# Patient Record
Sex: MALE | Race: WHITE | Employment: OTHER | URBAN - METROPOLITAN AREA
[De-identification: names, ages, dates, MRNs, and addresses within clinical notes are randomized per-mention and may not be internally consistent; named-entity substitution may affect disease eponyms.]

---

## 2017-01-01 ENCOUNTER — HOSPITAL ENCOUNTER (INPATIENT)
Facility: CLINIC | Age: 77
LOS: 5 days | DRG: 190 | End: 2017-02-03
Attending: EMERGENCY MEDICINE | Admitting: INTERNAL MEDICINE
Payer: COMMERCIAL

## 2017-01-01 ENCOUNTER — TELEPHONE (OUTPATIENT)
Dept: OTHER | Facility: CLINIC | Age: 77
End: 2017-01-01

## 2017-01-01 ENCOUNTER — APPOINTMENT (OUTPATIENT)
Dept: SPEECH THERAPY | Facility: CLINIC | Age: 77
DRG: 190 | End: 2017-01-01
Attending: INTERNAL MEDICINE
Payer: COMMERCIAL

## 2017-01-01 ENCOUNTER — APPOINTMENT (OUTPATIENT)
Dept: GENERAL RADIOLOGY | Facility: CLINIC | Age: 77
DRG: 190 | End: 2017-01-01
Attending: EMERGENCY MEDICINE
Payer: COMMERCIAL

## 2017-01-01 ENCOUNTER — APPOINTMENT (OUTPATIENT)
Dept: SPEECH THERAPY | Facility: CLINIC | Age: 77
DRG: 190 | End: 2017-01-01
Payer: COMMERCIAL

## 2017-01-01 ENCOUNTER — APPOINTMENT (OUTPATIENT)
Dept: ULTRASOUND IMAGING | Facility: CLINIC | Age: 77
DRG: 190 | End: 2017-01-01
Attending: INTERNAL MEDICINE
Payer: COMMERCIAL

## 2017-01-01 ENCOUNTER — APPOINTMENT (OUTPATIENT)
Dept: GENERAL RADIOLOGY | Facility: CLINIC | Age: 77
End: 2017-01-01
Attending: EMERGENCY MEDICINE
Payer: COMMERCIAL

## 2017-01-01 ENCOUNTER — TRANSFERRED RECORDS (OUTPATIENT)
Dept: HEALTH INFORMATION MANAGEMENT | Facility: CLINIC | Age: 77
End: 2017-01-01

## 2017-01-01 ENCOUNTER — PRE VISIT (OUTPATIENT)
Dept: CARDIOLOGY | Facility: CLINIC | Age: 77
End: 2017-01-01

## 2017-01-01 ENCOUNTER — APPOINTMENT (OUTPATIENT)
Dept: CARDIOLOGY | Facility: CLINIC | Age: 77
DRG: 190 | End: 2017-01-01
Attending: INTERNAL MEDICINE
Payer: COMMERCIAL

## 2017-01-01 ENCOUNTER — HOSPITAL ENCOUNTER (EMERGENCY)
Facility: CLINIC | Age: 77
Discharge: HOME | End: 2017-01-13
Attending: EMERGENCY MEDICINE | Admitting: EMERGENCY MEDICINE
Payer: COMMERCIAL

## 2017-01-01 ENCOUNTER — OFFICE VISIT (OUTPATIENT)
Dept: CARDIOLOGY | Facility: CLINIC | Age: 77
End: 2017-01-01

## 2017-01-01 ENCOUNTER — APPOINTMENT (OUTPATIENT)
Dept: CT IMAGING | Facility: CLINIC | Age: 77
DRG: 190 | End: 2017-01-01
Attending: INTERNAL MEDICINE
Payer: COMMERCIAL

## 2017-01-01 ENCOUNTER — HOSPITAL ENCOUNTER (OUTPATIENT)
Facility: CLINIC | Age: 77
End: 2017-01-01
Attending: RADIOLOGY | Admitting: RADIOLOGY
Payer: COMMERCIAL

## 2017-01-01 ENCOUNTER — CARE COORDINATION (OUTPATIENT)
Dept: OTHER | Facility: CLINIC | Age: 77
End: 2017-01-01

## 2017-01-01 VITALS
OXYGEN SATURATION: 94 % | SYSTOLIC BLOOD PRESSURE: 74 MMHG | DIASTOLIC BLOOD PRESSURE: 40 MMHG | BODY MASS INDEX: 20.69 KG/M2 | HEIGHT: 62 IN | WEIGHT: 112.43 LBS | HEART RATE: 90 BPM

## 2017-01-01 VITALS
SYSTOLIC BLOOD PRESSURE: 123 MMHG | BODY MASS INDEX: 22.45 KG/M2 | OXYGEN SATURATION: 97 % | WEIGHT: 139 LBS | HEART RATE: 97 BPM | RESPIRATION RATE: 18 BRPM | DIASTOLIC BLOOD PRESSURE: 90 MMHG | TEMPERATURE: 97.7 F

## 2017-01-01 VITALS
WEIGHT: 117.95 LBS | SYSTOLIC BLOOD PRESSURE: 70 MMHG | TEMPERATURE: 98.6 F | DIASTOLIC BLOOD PRESSURE: 40 MMHG | HEART RATE: 82 BPM | BODY MASS INDEX: 21.57 KG/M2 | RESPIRATION RATE: 10 BRPM | OXYGEN SATURATION: 100 %

## 2017-01-01 DIAGNOSIS — I50.9 ACUTE ON CHRONIC CONGESTIVE HEART FAILURE, UNSPECIFIED CONGESTIVE HEART FAILURE TYPE: ICD-10-CM

## 2017-01-01 DIAGNOSIS — J44.1 COPD EXACERBATION (H): ICD-10-CM

## 2017-01-01 DIAGNOSIS — D64.9 ANEMIA, UNSPECIFIED TYPE: ICD-10-CM

## 2017-01-01 DIAGNOSIS — R09.02 HYPOXEMIA: ICD-10-CM

## 2017-01-01 DIAGNOSIS — Z95.1 S/P CABG (CORONARY ARTERY BYPASS GRAFT): Primary | ICD-10-CM

## 2017-01-01 DIAGNOSIS — T85.528A DISLODGED GASTROSTOMY TUBE: ICD-10-CM

## 2017-01-01 LAB
ABO + RH BLD: NORMAL
ABO + RH BLD: NORMAL
ALBUMIN SERPL-MCNC: 2.2 G/DL (ref 3.4–5)
ALBUMIN SERPL-MCNC: 2.5 G/DL (ref 3.4–5)
ALBUMIN UR-MCNC: 10 MG/DL
ALP SERPL-CCNC: 145 U/L (ref 40–150)
ALP SERPL-CCNC: 192 U/L (ref 40–150)
ALT SERPL W P-5'-P-CCNC: 49 U/L (ref 0–70)
ALT SERPL W P-5'-P-CCNC: 69 U/L (ref 0–70)
ANION GAP SERPL CALCULATED.3IONS-SCNC: 11 MMOL/L (ref 3–14)
ANION GAP SERPL CALCULATED.3IONS-SCNC: 12 MMOL/L (ref 3–14)
ANION GAP SERPL CALCULATED.3IONS-SCNC: 5 MMOL/L (ref 3–14)
ANION GAP SERPL CALCULATED.3IONS-SCNC: 8 MMOL/L (ref 3–14)
ANION GAP SERPL CALCULATED.3IONS-SCNC: 9 MMOL/L (ref 3–14)
ANION GAP SERPL CALCULATED.3IONS-SCNC: 9 MMOL/L (ref 3–14)
APPEARANCE UR: CLEAR
AST SERPL W P-5'-P-CCNC: 111 U/L (ref 0–45)
AST SERPL W P-5'-P-CCNC: 69 U/L (ref 0–45)
BACTERIA SPEC CULT: ABNORMAL
BASE DEFICIT BLDA-SCNC: 4.7 MMOL/L
BASE DEFICIT BLDV-SCNC: 0.3 MMOL/L
BASE EXCESS BLD CALC-SCNC: 7.3 MMOL/L
BASE EXCESS BLDA CALC-SCNC: 7.7 MMOL/L
BASE EXCESS BLDA CALC-SCNC: 8 MMOL/L
BASE EXCESS BLDA CALC-SCNC: 8.1 MMOL/L
BASOPHILS # BLD AUTO: 0 10E9/L (ref 0–0.2)
BASOPHILS # BLD AUTO: 0 10E9/L (ref 0–0.2)
BASOPHILS NFR BLD AUTO: 0.2 %
BASOPHILS NFR BLD AUTO: 0.2 %
BILIRUB SERPL-MCNC: 0.4 MG/DL (ref 0.2–1.3)
BILIRUB SERPL-MCNC: 0.4 MG/DL (ref 0.2–1.3)
BILIRUB UR QL STRIP: NEGATIVE
BLD GP AB SCN SERPL QL: NORMAL
BLD PROD TYP BPU: NORMAL
BLD PROD TYP BPU: NORMAL
BLD UNIT ID BPU: 0
BLOOD BANK CMNT PATIENT-IMP: NORMAL
BLOOD PRODUCT CODE: NORMAL
BPU ID: NORMAL
BUN SERPL-MCNC: 11 MG/DL (ref 7–30)
BUN SERPL-MCNC: 13 MG/DL (ref 7–30)
BUN SERPL-MCNC: 15 MG/DL (ref 7–30)
BUN SERPL-MCNC: 18 MG/DL (ref 7–30)
BUN SERPL-MCNC: 19 MG/DL (ref 7–30)
BUN SERPL-MCNC: 28 MG/DL (ref 7–30)
C DIFF TOX B STL QL: ABNORMAL
CALCIUM SERPL-MCNC: 7.3 MG/DL (ref 8.5–10.1)
CALCIUM SERPL-MCNC: 7.5 MG/DL (ref 8.5–10.1)
CALCIUM SERPL-MCNC: 7.7 MG/DL (ref 8.5–10.1)
CALCIUM SERPL-MCNC: 7.9 MG/DL (ref 8.5–10.1)
CALCIUM SERPL-MCNC: 7.9 MG/DL (ref 8.5–10.1)
CALCIUM SERPL-MCNC: 8.8 MG/DL (ref 8.5–10.1)
CAOX CRY #/AREA URNS HPF: ABNORMAL /HPF
CHLORIDE SERPL-SCNC: 101 MMOL/L (ref 94–109)
CHLORIDE SERPL-SCNC: 105 MMOL/L (ref 94–109)
CHLORIDE SERPL-SCNC: 110 MMOL/L (ref 94–109)
CHLORIDE SERPL-SCNC: 111 MMOL/L (ref 94–109)
CHLORIDE SERPL-SCNC: 114 MMOL/L (ref 94–109)
CHLORIDE SERPL-SCNC: 115 MMOL/L (ref 94–109)
CO2 SERPL-SCNC: 22 MMOL/L (ref 20–32)
CO2 SERPL-SCNC: 23 MMOL/L (ref 20–32)
CO2 SERPL-SCNC: 24 MMOL/L (ref 20–32)
CO2 SERPL-SCNC: 26 MMOL/L (ref 20–32)
CO2 SERPL-SCNC: 30 MMOL/L (ref 20–32)
CO2 SERPL-SCNC: 31 MMOL/L (ref 20–32)
COLOR UR AUTO: YELLOW
CREAT SERPL-MCNC: 0.63 MG/DL (ref 0.66–1.25)
CREAT SERPL-MCNC: 0.64 MG/DL (ref 0.66–1.25)
CREAT SERPL-MCNC: 0.67 MG/DL (ref 0.66–1.25)
CREAT SERPL-MCNC: 0.75 MG/DL (ref 0.66–1.25)
CREAT SERPL-MCNC: 0.96 MG/DL (ref 0.66–1.25)
DIFFERENTIAL METHOD BLD: ABNORMAL
DIFFERENTIAL METHOD BLD: ABNORMAL
EOSINOPHIL # BLD AUTO: 0.2 10E9/L (ref 0–0.7)
EOSINOPHIL # BLD AUTO: 0.3 10E9/L (ref 0–0.7)
EOSINOPHIL NFR BLD AUTO: 2.3 %
EOSINOPHIL NFR BLD AUTO: 2.5 %
ERYTHROCYTE [DISTWIDTH] IN BLOOD BY AUTOMATED COUNT: 16.5 % (ref 10–15)
ERYTHROCYTE [DISTWIDTH] IN BLOOD BY AUTOMATED COUNT: 17.3 % (ref 10–15)
ERYTHROCYTE [DISTWIDTH] IN BLOOD BY AUTOMATED COUNT: 17.6 % (ref 10–15)
ERYTHROCYTE [DISTWIDTH] IN BLOOD BY AUTOMATED COUNT: 17.9 % (ref 10–15)
FLUAV H1 2009 PAND RNA SPEC QL NAA+PROBE: NEGATIVE
FLUAV H1 RNA SPEC QL NAA+PROBE: NEGATIVE
FLUAV H3 RNA SPEC QL NAA+PROBE: NEGATIVE
FLUAV RNA SPEC QL NAA+PROBE: NEGATIVE
FLUAV+FLUBV AG SPEC QL: NEGATIVE
FLUAV+FLUBV AG SPEC QL: NORMAL
FLUBV RNA SPEC QL NAA+PROBE: NEGATIVE
GFR SERPL CREATININE-BSD FRML MDRD: 76 ML/MIN/1.7M2
GFR SERPL CREATININE-BSD FRML MDRD: ABNORMAL ML/MIN/1.7M2
GFR SERPL CREATININE-BSD FRML MDRD: NORMAL ML/MIN/1.7M2
GLUCOSE BLDC GLUCOMTR-MCNC: 106 MG/DL (ref 70–99)
GLUCOSE BLDC GLUCOMTR-MCNC: 131 MG/DL (ref 70–99)
GLUCOSE BLDC GLUCOMTR-MCNC: 139 MG/DL (ref 70–99)
GLUCOSE SERPL-MCNC: 114 MG/DL (ref 70–99)
GLUCOSE SERPL-MCNC: 118 MG/DL (ref 70–99)
GLUCOSE SERPL-MCNC: 129 MG/DL (ref 70–99)
GLUCOSE SERPL-MCNC: 174 MG/DL (ref 70–99)
GLUCOSE SERPL-MCNC: 184 MG/DL (ref 70–99)
GLUCOSE SERPL-MCNC: 90 MG/DL (ref 70–99)
GLUCOSE UR STRIP-MCNC: NEGATIVE MG/DL
GRAM STN SPEC: NORMAL
HADV DNA SPEC QL NAA+PROBE: NEGATIVE
HADV DNA SPEC QL NAA+PROBE: NEGATIVE
HCO3 BLD-SCNC: 20 MMOL/L (ref 21–28)
HCO3 BLD-SCNC: 31 MMOL/L
HCO3 BLD-SCNC: 31 MMOL/L (ref 21–28)
HCO3 BLD-SCNC: 32 MMOL/L (ref 21–28)
HCO3 BLD-SCNC: 33 MMOL/L (ref 21–28)
HCO3 BLDV-SCNC: 23 MMOL/L (ref 21–28)
HCT VFR BLD AUTO: 21.2 % (ref 40–53)
HCT VFR BLD AUTO: 25 % (ref 40–53)
HCT VFR BLD AUTO: 25.1 % (ref 40–53)
HCT VFR BLD AUTO: 29.1 % (ref 40–53)
HGB BLD-MCNC: 7 G/DL (ref 13.3–17.7)
HGB BLD-MCNC: 8.2 G/DL (ref 13.3–17.7)
HGB BLD-MCNC: 8.4 G/DL (ref 13.3–17.7)
HGB BLD-MCNC: 9.9 G/DL (ref 13.3–17.7)
HGB UR QL STRIP: NEGATIVE
HMPV RNA SPEC QL NAA+PROBE: NEGATIVE
HPIV1 RNA SPEC QL NAA+PROBE: NEGATIVE
HPIV2 RNA SPEC QL NAA+PROBE: NEGATIVE
HPIV3 RNA SPEC QL NAA+PROBE: NEGATIVE
HYALINE CASTS #/AREA URNS LPF: 13 /LPF (ref 0–2)
IMM GRANULOCYTES # BLD: 0 10E9/L (ref 0–0.4)
IMM GRANULOCYTES # BLD: 0.1 10E9/L (ref 0–0.4)
IMM GRANULOCYTES NFR BLD: 0.3 %
IMM GRANULOCYTES NFR BLD: 0.5 %
INR PPP: 1.91 (ref 0.86–1.14)
INR PPP: 2.21 (ref 0.86–1.14)
INR PPP: 2.21 (ref 0.86–1.14)
INR PPP: 2.46 (ref 0.86–1.14)
INR PPP: 2.63 (ref 0.86–1.14)
INR PPP: 2.64 (ref 0.86–1.14)
INTERPRETATION ECG - MUSE: NORMAL
KETONES UR STRIP-MCNC: NEGATIVE MG/DL
LACTATE BLD-SCNC: 1.1 MMOL/L (ref 0.7–2.1)
LACTATE BLD-SCNC: 1.3 MMOL/L (ref 0.7–2.1)
LACTATE BLD-SCNC: 1.5 MMOL/L (ref 0.7–2.1)
LEUKOCYTE ESTERASE UR QL STRIP: NEGATIVE
LYMPHOCYTES # BLD AUTO: 1.2 10E9/L (ref 0.8–5.3)
LYMPHOCYTES # BLD AUTO: 1.4 10E9/L (ref 0.8–5.3)
LYMPHOCYTES NFR BLD AUTO: 12.3 %
LYMPHOCYTES NFR BLD AUTO: 14 %
Lab: NORMAL
MAGNESIUM SERPL-MCNC: 1.9 MG/DL (ref 1.6–2.3)
MAGNESIUM SERPL-MCNC: 2.1 MG/DL (ref 1.6–2.3)
MAGNESIUM SERPL-MCNC: 2.1 MG/DL (ref 1.6–2.3)
MCH RBC QN AUTO: 29.2 PG (ref 26.5–33)
MCH RBC QN AUTO: 29.8 PG (ref 26.5–33)
MCH RBC QN AUTO: 29.9 PG (ref 26.5–33)
MCH RBC QN AUTO: 31.1 PG (ref 26.5–33)
MCHC RBC AUTO-ENTMCNC: 32.8 G/DL (ref 31.5–36.5)
MCHC RBC AUTO-ENTMCNC: 33 G/DL (ref 31.5–36.5)
MCHC RBC AUTO-ENTMCNC: 33.5 G/DL (ref 31.5–36.5)
MCHC RBC AUTO-ENTMCNC: 34 G/DL (ref 31.5–36.5)
MCV RBC AUTO: 89 FL (ref 78–100)
MCV RBC AUTO: 89 FL (ref 78–100)
MCV RBC AUTO: 90 FL (ref 78–100)
MCV RBC AUTO: 92 FL (ref 78–100)
MICRO REPORT STATUS: ABNORMAL
MICRO REPORT STATUS: NORMAL
MICROBIOLOGIST REVIEW: NORMAL
MICROORGANISM SPEC CULT: ABNORMAL
MONOCYTES # BLD AUTO: 1 10E9/L (ref 0–1.3)
MONOCYTES # BLD AUTO: 1.1 10E9/L (ref 0–1.3)
MONOCYTES NFR BLD AUTO: 10.2 %
MONOCYTES NFR BLD AUTO: 10.7 %
MUCOUS THREADS #/AREA URNS LPF: PRESENT /LPF
NEUTROPHILS # BLD AUTO: 7 10E9/L (ref 1.6–8.3)
NEUTROPHILS # BLD AUTO: 7.2 10E9/L (ref 1.6–8.3)
NEUTROPHILS NFR BLD AUTO: 72.1 %
NEUTROPHILS NFR BLD AUTO: 74.7 %
NITRATE UR QL: NEGATIVE
NRBC # BLD AUTO: 0 10*3/UL
NRBC # BLD AUTO: 0 10*3/UL
NRBC BLD AUTO-RTO: 0 /100
NRBC BLD AUTO-RTO: 0 /100
NT-PROBNP SERPL-MCNC: 6870 PG/ML (ref 0–900)
NUM BPU REQUESTED: 1
O2/TOTAL GAS SETTING VFR VENT: ABNORMAL %
OXYHGB MFR BLD: 91 %
OXYHGB MFR BLD: 93 % (ref 92–100)
OXYHGB MFR BLD: 94 % (ref 92–100)
OXYHGB MFR BLD: 96 % (ref 92–100)
OXYHGB MFR BLD: 97 % (ref 92–100)
PCO2 BLD: 30 MM HG (ref 35–45)
PCO2 BLD: 39 MM HG (ref 35–45)
PCO2 BLD: 40 MM HG
PCO2 BLD: 40 MM HG (ref 35–45)
PCO2 BLD: 47 MM HG (ref 35–45)
PCO2 BLDV: 30 MM HG (ref 40–50)
PH BLD: 7.42 PH (ref 7.35–7.45)
PH BLD: 7.45 PH (ref 7.35–7.45)
PH BLD: 7.5 PH
PH BLD: 7.5 PH (ref 7.35–7.45)
PH BLD: 7.51 PH (ref 7.35–7.45)
PH BLDV: 7.5 PH (ref 7.32–7.43)
PH UR STRIP: 5.5 PH (ref 5–7)
PLATELET # BLD AUTO: 438 10E9/L (ref 150–450)
PLATELET # BLD AUTO: 441 10E9/L (ref 150–450)
PLATELET # BLD AUTO: 449 10E9/L (ref 150–450)
PLATELET # BLD AUTO: 563 10E9/L (ref 150–450)
PO2 BLD: 61 MM HG
PO2 BLD: 63 MM HG (ref 80–105)
PO2 BLD: 67 MM HG (ref 80–105)
PO2 BLD: 85 MM HG (ref 80–105)
PO2 BLD: 87 MM HG (ref 80–105)
PO2 BLDV: 44 MM HG (ref 25–47)
POTASSIUM SERPL-SCNC: 2.9 MMOL/L (ref 3.4–5.3)
POTASSIUM SERPL-SCNC: 3 MMOL/L (ref 3.4–5.3)
POTASSIUM SERPL-SCNC: 3.1 MMOL/L (ref 3.4–5.3)
POTASSIUM SERPL-SCNC: 3.5 MMOL/L (ref 3.4–5.3)
POTASSIUM SERPL-SCNC: 3.5 MMOL/L (ref 3.4–5.3)
POTASSIUM SERPL-SCNC: 3.6 MMOL/L (ref 3.4–5.3)
POTASSIUM SERPL-SCNC: 3.6 MMOL/L (ref 3.4–5.3)
POTASSIUM SERPL-SCNC: 4.1 MMOL/L (ref 3.4–5.3)
POTASSIUM SERPL-SCNC: 4.2 MMOL/L (ref 3.4–5.3)
PROCALCITONIN SERPL-MCNC: 0.06 NG/ML
PROT SERPL-MCNC: 6.8 G/DL (ref 6.8–8.8)
PROT SERPL-MCNC: 8.4 G/DL (ref 6.8–8.8)
RBC # BLD AUTO: 2.35 10E12/L (ref 4.4–5.9)
RBC # BLD AUTO: 2.81 10E12/L (ref 4.4–5.9)
RBC # BLD AUTO: 2.81 10E12/L (ref 4.4–5.9)
RBC # BLD AUTO: 3.18 10E12/L (ref 4.4–5.9)
RBC #/AREA URNS AUTO: 1 /HPF (ref 0–2)
RHINOVIRUS RNA SPEC QL NAA+PROBE: NEGATIVE
RSV RNA SPEC QL NAA+PROBE: NEGATIVE
RSV RNA SPEC QL NAA+PROBE: NEGATIVE
SODIUM SERPL-SCNC: 136 MMOL/L (ref 133–144)
SODIUM SERPL-SCNC: 140 MMOL/L (ref 133–144)
SODIUM SERPL-SCNC: 147 MMOL/L (ref 133–144)
SODIUM SERPL-SCNC: 149 MMOL/L (ref 133–144)
SP GR UR STRIP: 1.01 (ref 1–1.03)
SPECIMEN EXP DATE BLD: NORMAL
SPECIMEN SOURCE: ABNORMAL
SPECIMEN SOURCE: ABNORMAL
SPECIMEN SOURCE: NORMAL
TRANSFUSION STATUS PATIENT QL: NORMAL
TRANSFUSION STATUS PATIENT QL: NORMAL
TROPONIN I SERPL-MCNC: 0.02 UG/L (ref 0–0.04)
URN SPEC COLLECT METH UR: ABNORMAL
UROBILINOGEN UR STRIP-MCNC: NORMAL MG/DL (ref 0–2)
VORICONAZOLE SERPL-MCNC: 6.6 UG/ML
WBC # BLD AUTO: 10 10E9/L (ref 4–11)
WBC # BLD AUTO: 15.4 10E9/L (ref 4–11)
WBC # BLD AUTO: 9.4 10E9/L (ref 4–11)
WBC # BLD AUTO: 9.9 10E9/L (ref 4–11)
WBC #/AREA URNS AUTO: 1 /HPF (ref 0–2)

## 2017-01-01 PROCEDURE — 82803 BLOOD GASES ANY COMBINATION: CPT | Performed by: EMERGENCY MEDICINE

## 2017-01-01 PROCEDURE — 83735 ASSAY OF MAGNESIUM: CPT | Performed by: INTERNAL MEDICINE

## 2017-01-01 PROCEDURE — 85027 COMPLETE CBC AUTOMATED: CPT | Performed by: INTERNAL MEDICINE

## 2017-01-01 PROCEDURE — 87186 SC STD MICRODIL/AGAR DIL: CPT | Performed by: INTERNAL MEDICINE

## 2017-01-01 PROCEDURE — 40000275 ZZH STATISTIC RCP TIME EA 10 MIN

## 2017-01-01 PROCEDURE — 00000146 ZZHCL STATISTIC GLUCOSE BY METER IP

## 2017-01-01 PROCEDURE — 82565 ASSAY OF CREATININE: CPT | Performed by: INTERNAL MEDICINE

## 2017-01-01 PROCEDURE — 99292 CRITICAL CARE ADDL 30 MIN: CPT | Performed by: INTERNAL MEDICINE

## 2017-01-01 PROCEDURE — 40000886 ZZH STATISTIC STEP DOWN HRS DAY

## 2017-01-01 PROCEDURE — 36415 COLL VENOUS BLD VENIPUNCTURE: CPT | Performed by: INTERNAL MEDICINE

## 2017-01-01 PROCEDURE — 99291 CRITICAL CARE FIRST HOUR: CPT | Performed by: INTERNAL MEDICINE

## 2017-01-01 PROCEDURE — 83605 ASSAY OF LACTIC ACID: CPT | Performed by: INTERNAL MEDICINE

## 2017-01-01 PROCEDURE — 36415 COLL VENOUS BLD VENIPUNCTURE: CPT

## 2017-01-01 PROCEDURE — 94640 AIRWAY INHALATION TREATMENT: CPT | Mod: 76

## 2017-01-01 PROCEDURE — 25000128 H RX IP 250 OP 636: Performed by: INTERNAL MEDICINE

## 2017-01-01 PROCEDURE — 80299 QUANTITATIVE ASSAY DRUG: CPT | Performed by: INTERNAL MEDICINE

## 2017-01-01 PROCEDURE — 40000884 ZZH STATISTIC STEP DOWN HRS NIGHT

## 2017-01-01 PROCEDURE — 86923 COMPATIBILITY TEST ELECTRIC: CPT | Performed by: INTERNAL MEDICINE

## 2017-01-01 PROCEDURE — 84132 ASSAY OF SERUM POTASSIUM: CPT | Performed by: INTERNAL MEDICINE

## 2017-01-01 PROCEDURE — 99233 SBSQ HOSP IP/OBS HIGH 50: CPT | Performed by: INTERNAL MEDICINE

## 2017-01-01 PROCEDURE — 20000003 ZZH R&B ICU

## 2017-01-01 PROCEDURE — 80048 BASIC METABOLIC PNL TOTAL CA: CPT | Performed by: INTERNAL MEDICINE

## 2017-01-01 PROCEDURE — 12000007 ZZH R&B INTERMEDIATE

## 2017-01-01 PROCEDURE — 40000225 ZZH STATISTIC SLP WARD VISIT

## 2017-01-01 PROCEDURE — 71010 XR CHEST PORT 1 VW: CPT

## 2017-01-01 PROCEDURE — 80053 COMPREHEN METABOLIC PANEL: CPT | Performed by: EMERGENCY MEDICINE

## 2017-01-01 PROCEDURE — 85610 PROTHROMBIN TIME: CPT | Performed by: INTERNAL MEDICINE

## 2017-01-01 PROCEDURE — 25000125 ZZHC RX 250: Performed by: INTERNAL MEDICINE

## 2017-01-01 PROCEDURE — 21400002 ZZH R&B CCU CICU CRITICAL

## 2017-01-01 PROCEDURE — 25000132 ZZH RX MED GY IP 250 OP 250 PS 637: Performed by: INTERNAL MEDICINE

## 2017-01-01 PROCEDURE — 87804 INFLUENZA ASSAY W/OPTIC: CPT | Performed by: INTERNAL MEDICINE

## 2017-01-01 PROCEDURE — 87040 BLOOD CULTURE FOR BACTERIA: CPT | Performed by: EMERGENCY MEDICINE

## 2017-01-01 PROCEDURE — 94640 AIRWAY INHALATION TREATMENT: CPT

## 2017-01-01 PROCEDURE — 82805 BLOOD GASES W/O2 SATURATION: CPT | Performed by: INTERNAL MEDICINE

## 2017-01-01 PROCEDURE — 94660 CPAP INITIATION&MGMT: CPT

## 2017-01-01 PROCEDURE — 40000281 ZZH STATISTIC TRANSPORT TIME EA 15 MIN

## 2017-01-01 PROCEDURE — 40000885 ZZH STATISTIC STEP DOWN HRS EVENING

## 2017-01-01 PROCEDURE — 92526 ORAL FUNCTION THERAPY: CPT | Mod: GN

## 2017-01-01 PROCEDURE — 87633 RESP VIRUS 12-25 TARGETS: CPT | Performed by: INTERNAL MEDICINE

## 2017-01-01 PROCEDURE — 99207 ZZC NON-BILLABLE SERV PER CHARTING: CPT | Performed by: INTERNAL MEDICINE

## 2017-01-01 PROCEDURE — P9016 RBC LEUKOCYTES REDUCED: HCPCS | Performed by: INTERNAL MEDICINE

## 2017-01-01 PROCEDURE — 99285 EMERGENCY DEPT VISIT HI MDM: CPT | Mod: 25

## 2017-01-01 PROCEDURE — 93325 DOPPLER ECHO COLOR FLOW MAPG: CPT | Mod: 26 | Performed by: INTERNAL MEDICINE

## 2017-01-01 PROCEDURE — 93308 TTE F-UP OR LMTD: CPT

## 2017-01-01 PROCEDURE — 96375 TX/PRO/DX INJ NEW DRUG ADDON: CPT

## 2017-01-01 PROCEDURE — 96366 THER/PROPH/DIAG IV INF ADDON: CPT

## 2017-01-01 PROCEDURE — 87077 CULTURE AEROBIC IDENTIFY: CPT | Performed by: INTERNAL MEDICINE

## 2017-01-01 PROCEDURE — 86900 BLOOD TYPING SEROLOGIC ABO: CPT | Performed by: INTERNAL MEDICINE

## 2017-01-01 PROCEDURE — 99223 1ST HOSP IP/OBS HIGH 75: CPT | Mod: AI | Performed by: INTERNAL MEDICINE

## 2017-01-01 PROCEDURE — 87205 SMEAR GRAM STAIN: CPT | Performed by: INTERNAL MEDICINE

## 2017-01-01 PROCEDURE — 86901 BLOOD TYPING SEROLOGIC RH(D): CPT | Performed by: INTERNAL MEDICINE

## 2017-01-01 PROCEDURE — 25000128 H RX IP 250 OP 636: Performed by: EMERGENCY MEDICINE

## 2017-01-01 PROCEDURE — 25000132 ZZH RX MED GY IP 250 OP 250 PS 637

## 2017-01-01 PROCEDURE — 86850 RBC ANTIBODY SCREEN: CPT | Performed by: INTERNAL MEDICINE

## 2017-01-01 PROCEDURE — 99207 ZZC CDG-CORRECTLY CODED, REVIEWED AND AGREE: CPT | Performed by: INTERNAL MEDICINE

## 2017-01-01 PROCEDURE — 85025 COMPLETE CBC W/AUTO DIFF WBC: CPT | Performed by: EMERGENCY MEDICINE

## 2017-01-01 PROCEDURE — 93005 ELECTROCARDIOGRAM TRACING: CPT

## 2017-01-01 PROCEDURE — 25000125 ZZHC RX 250: Performed by: EMERGENCY MEDICINE

## 2017-01-01 PROCEDURE — 85610 PROTHROMBIN TIME: CPT | Performed by: EMERGENCY MEDICINE

## 2017-01-01 PROCEDURE — 93010 ELECTROCARDIOGRAM REPORT: CPT | Performed by: INTERNAL MEDICINE

## 2017-01-01 PROCEDURE — 25000125 ZZHC RX 250

## 2017-01-01 PROCEDURE — 83605 ASSAY OF LACTIC ACID: CPT | Performed by: EMERGENCY MEDICINE

## 2017-01-01 PROCEDURE — 40000940 XR ABDOMEN 1 VW

## 2017-01-01 PROCEDURE — 84145 PROCALCITONIN (PCT): CPT | Performed by: EMERGENCY MEDICINE

## 2017-01-01 PROCEDURE — 84484 ASSAY OF TROPONIN QUANT: CPT | Performed by: EMERGENCY MEDICINE

## 2017-01-01 PROCEDURE — 93970 EXTREMITY STUDY: CPT

## 2017-01-01 PROCEDURE — 92526 ORAL FUNCTION THERAPY: CPT | Mod: GN | Performed by: SPEECH-LANGUAGE PATHOLOGIST

## 2017-01-01 PROCEDURE — 96365 THER/PROPH/DIAG IV INF INIT: CPT

## 2017-01-01 PROCEDURE — 40000225 ZZH STATISTIC SLP WARD VISIT: Performed by: SPEECH-LANGUAGE PATHOLOGIST

## 2017-01-01 PROCEDURE — 93308 TTE F-UP OR LMTD: CPT | Mod: 26 | Performed by: INTERNAL MEDICINE

## 2017-01-01 PROCEDURE — 71250 CT THORAX DX C-: CPT

## 2017-01-01 PROCEDURE — 81001 URINALYSIS AUTO W/SCOPE: CPT | Performed by: EMERGENCY MEDICINE

## 2017-01-01 PROCEDURE — 99223 1ST HOSP IP/OBS HIGH 75: CPT | Performed by: INTERNAL MEDICINE

## 2017-01-01 PROCEDURE — 87493 C DIFF AMPLIFIED PROBE: CPT | Performed by: INTERNAL MEDICINE

## 2017-01-01 PROCEDURE — 92610 EVALUATE SWALLOWING FUNCTION: CPT | Mod: GN

## 2017-01-01 PROCEDURE — 36600 WITHDRAWAL OF ARTERIAL BLOOD: CPT

## 2017-01-01 PROCEDURE — 87070 CULTURE OTHR SPECIMN AEROBIC: CPT | Performed by: INTERNAL MEDICINE

## 2017-01-01 PROCEDURE — 83880 ASSAY OF NATRIURETIC PEPTIDE: CPT | Performed by: EMERGENCY MEDICINE

## 2017-01-01 PROCEDURE — 93321 DOPPLER ECHO F-UP/LMTD STD: CPT | Mod: 26 | Performed by: INTERNAL MEDICINE

## 2017-01-01 PROCEDURE — 71020 XR CHEST 2 VW: CPT

## 2017-01-01 RX ORDER — LEVOFLOXACIN 5 MG/ML
750 INJECTION, SOLUTION INTRAVENOUS ONCE
Status: COMPLETED | OUTPATIENT
Start: 2017-01-01 | End: 2017-01-01

## 2017-01-01 RX ORDER — MORPHINE SULFATE 2 MG/ML
2 INJECTION, SOLUTION INTRAMUSCULAR; INTRAVENOUS ONCE
Status: COMPLETED | OUTPATIENT
Start: 2017-01-01 | End: 2017-01-01

## 2017-01-01 RX ORDER — LEVOFLOXACIN 5 MG/ML
750 INJECTION, SOLUTION INTRAVENOUS EVERY 24 HOURS
Status: DISCONTINUED | OUTPATIENT
Start: 2017-01-01 | End: 2017-01-01

## 2017-01-01 RX ORDER — AMIODARONE HYDROCHLORIDE 200 MG/1
200 TABLET ORAL DAILY
Qty: 60 TABLET | Refills: 1 | Status: SHIPPED | OUTPATIENT
Start: 2017-01-01

## 2017-01-01 RX ORDER — MORPHINE SULFATE 4 MG/ML
4-8 INJECTION, SOLUTION INTRAMUSCULAR; INTRAVENOUS EVERY 30 MIN PRN
Status: DISCONTINUED | OUTPATIENT
Start: 2017-01-01 | End: 2017-01-01

## 2017-01-01 RX ORDER — HALOPERIDOL 5 MG/ML
2-4 INJECTION INTRAMUSCULAR EVERY 4 HOURS PRN
Status: DISCONTINUED | OUTPATIENT
Start: 2017-01-01 | End: 2017-01-01 | Stop reason: HOSPADM

## 2017-01-01 RX ORDER — AMIODARONE HYDROCHLORIDE 200 MG/1
200 TABLET ORAL DAILY
Status: DISCONTINUED | OUTPATIENT
Start: 2017-01-01 | End: 2017-01-01 | Stop reason: SINTOL

## 2017-01-01 RX ORDER — ONDANSETRON 2 MG/ML
4 INJECTION INTRAMUSCULAR; INTRAVENOUS EVERY 6 HOURS PRN
Status: DISCONTINUED | OUTPATIENT
Start: 2017-01-01 | End: 2017-01-01 | Stop reason: HOSPADM

## 2017-01-01 RX ORDER — IPRATROPIUM BROMIDE AND ALBUTEROL SULFATE 2.5; .5 MG/3ML; MG/3ML
SOLUTION RESPIRATORY (INHALATION)
Status: COMPLETED
Start: 2017-01-01 | End: 2017-01-01

## 2017-01-01 RX ORDER — PREDNISONE 20 MG/1
40 TABLET ORAL DAILY
Status: DISCONTINUED | OUTPATIENT
Start: 2017-01-01 | End: 2017-01-01

## 2017-01-01 RX ORDER — FUROSEMIDE 10 MG/ML
40 INJECTION INTRAMUSCULAR; INTRAVENOUS ONCE
Status: COMPLETED | OUTPATIENT
Start: 2017-01-01 | End: 2017-01-01

## 2017-01-01 RX ORDER — FUROSEMIDE 10 MG/ML
40 INJECTION INTRAMUSCULAR; INTRAVENOUS ONCE
Status: DISCONTINUED | OUTPATIENT
Start: 2017-01-01 | End: 2017-01-01 | Stop reason: HOSPADM

## 2017-01-01 RX ORDER — MAGNESIUM SULFATE HEPTAHYDRATE 40 MG/ML
4 INJECTION, SOLUTION INTRAVENOUS EVERY 4 HOURS PRN
Status: DISCONTINUED | OUTPATIENT
Start: 2017-01-01 | End: 2017-01-01

## 2017-01-01 RX ORDER — LIDOCAINE 40 MG/G
CREAM TOPICAL
Status: DISCONTINUED | OUTPATIENT
Start: 2017-01-01 | End: 2017-01-01

## 2017-01-01 RX ORDER — ASPIRIN 81 MG/1
81 TABLET, CHEWABLE ORAL DAILY
Status: DISCONTINUED | OUTPATIENT
Start: 2017-01-01 | End: 2017-01-01

## 2017-01-01 RX ORDER — PIPERACILLIN SODIUM, TAZOBACTAM SODIUM 4; .5 G/20ML; G/20ML
4.5 INJECTION, POWDER, LYOPHILIZED, FOR SOLUTION INTRAVENOUS EVERY 6 HOURS
Status: DISCONTINUED | OUTPATIENT
Start: 2017-01-01 | End: 2017-01-01

## 2017-01-01 RX ORDER — IPRATROPIUM BROMIDE AND ALBUTEROL SULFATE 2.5; .5 MG/3ML; MG/3ML
3 SOLUTION RESPIRATORY (INHALATION)
Status: DISCONTINUED | OUTPATIENT
Start: 2017-01-01 | End: 2017-01-01

## 2017-01-01 RX ORDER — POTASSIUM CHLORIDE 7.45 MG/ML
10 INJECTION INTRAVENOUS
Status: DISCONTINUED | OUTPATIENT
Start: 2017-01-01 | End: 2017-01-01

## 2017-01-01 RX ORDER — WARFARIN SODIUM 1 MG/1
1 TABLET ORAL
Status: COMPLETED | OUTPATIENT
Start: 2017-01-01 | End: 2017-01-01

## 2017-01-01 RX ORDER — METOPROLOL SUCCINATE 50 MG/1
50 TABLET, EXTENDED RELEASE ORAL EVERY EVENING
Status: DISCONTINUED | OUTPATIENT
Start: 2017-01-01 | End: 2017-01-01

## 2017-01-01 RX ORDER — AMOXICILLIN 250 MG
1-2 CAPSULE ORAL 2 TIMES DAILY PRN
Status: DISCONTINUED | OUTPATIENT
Start: 2017-01-01 | End: 2017-01-01

## 2017-01-01 RX ORDER — POTASSIUM CHLORIDE 29.8 MG/ML
20 INJECTION INTRAVENOUS
Status: DISCONTINUED | OUTPATIENT
Start: 2017-01-01 | End: 2017-01-01

## 2017-01-01 RX ORDER — BISACODYL 10 MG
10 SUPPOSITORY, RECTAL RECTAL DAILY PRN
Status: DISCONTINUED | OUTPATIENT
Start: 2017-01-01 | End: 2017-01-01 | Stop reason: HOSPADM

## 2017-01-01 RX ORDER — CEFAZOLIN SODIUM 1 G/50ML
1250 SOLUTION INTRAVENOUS EVERY 12 HOURS
Status: DISCONTINUED | OUTPATIENT
Start: 2017-01-01 | End: 2017-01-01

## 2017-01-01 RX ORDER — ONDANSETRON 4 MG/1
4 TABLET, ORALLY DISINTEGRATING ORAL EVERY 6 HOURS PRN
Status: DISCONTINUED | OUTPATIENT
Start: 2017-01-01 | End: 2017-01-01 | Stop reason: HOSPADM

## 2017-01-01 RX ORDER — ONDANSETRON 2 MG/ML
4 INJECTION INTRAMUSCULAR; INTRAVENOUS EVERY 6 HOURS PRN
Status: DISCONTINUED | OUTPATIENT
Start: 2017-01-01 | End: 2017-01-01

## 2017-01-01 RX ORDER — HALOPERIDOL 5 MG/ML
1 INJECTION INTRAMUSCULAR EVERY 6 HOURS PRN
Status: DISCONTINUED | OUTPATIENT
Start: 2017-01-01 | End: 2017-01-01

## 2017-01-01 RX ORDER — HALOPERIDOL 5 MG/ML
1-2 INJECTION INTRAMUSCULAR EVERY 4 HOURS PRN
Status: DISCONTINUED | OUTPATIENT
Start: 2017-01-01 | End: 2017-01-01

## 2017-01-01 RX ORDER — POTASSIUM CHLORIDE 1.5 G/1.58G
20-40 POWDER, FOR SOLUTION ORAL
Status: DISCONTINUED | OUTPATIENT
Start: 2017-01-01 | End: 2017-01-01

## 2017-01-01 RX ORDER — MORPHINE SULFATE 2 MG/ML
2-4 INJECTION, SOLUTION INTRAMUSCULAR; INTRAVENOUS EVERY 30 MIN PRN
Status: DISCONTINUED | OUTPATIENT
Start: 2017-01-01 | End: 2017-01-01

## 2017-01-01 RX ORDER — GLYCOPYRROLATE 0.2 MG/ML
.2-.4 INJECTION, SOLUTION INTRAMUSCULAR; INTRAVENOUS EVERY 4 HOURS PRN
Status: DISCONTINUED | OUTPATIENT
Start: 2017-01-01 | End: 2017-01-01 | Stop reason: HOSPADM

## 2017-01-01 RX ORDER — POTASSIUM CHLORIDE 1500 MG/1
20-40 TABLET, EXTENDED RELEASE ORAL
Status: DISCONTINUED | OUTPATIENT
Start: 2017-01-01 | End: 2017-01-01

## 2017-01-01 RX ORDER — PIPERACILLIN SODIUM, TAZOBACTAM SODIUM 4; .5 G/20ML; G/20ML
4.5 INJECTION, POWDER, LYOPHILIZED, FOR SOLUTION INTRAVENOUS ONCE
Status: COMPLETED | OUTPATIENT
Start: 2017-01-01 | End: 2017-01-01

## 2017-01-01 RX ORDER — MULTIPLE VITAMINS W/ MINERALS TAB 9MG-400MCG
1 TAB ORAL DAILY
COMMUNITY

## 2017-01-01 RX ORDER — CEFTRIAXONE 1 G/1
1 INJECTION, POWDER, FOR SOLUTION INTRAMUSCULAR; INTRAVENOUS EVERY 24 HOURS
Status: DISCONTINUED | OUTPATIENT
Start: 2017-01-01 | End: 2017-01-01

## 2017-01-01 RX ORDER — VANCOMYCIN HYDROCHLORIDE 1 G/200ML
1000 INJECTION, SOLUTION INTRAVENOUS EVERY 12 HOURS
Status: DISCONTINUED | OUTPATIENT
Start: 2017-01-01 | End: 2017-01-01

## 2017-01-01 RX ORDER — LORAZEPAM 2 MG/ML
.5-1 INJECTION INTRAMUSCULAR EVERY 6 HOURS PRN
Status: DISCONTINUED | OUTPATIENT
Start: 2017-01-01 | End: 2017-01-01

## 2017-01-01 RX ORDER — PROCHLORPERAZINE 25 MG
12.5 SUPPOSITORY, RECTAL RECTAL EVERY 12 HOURS PRN
Status: DISCONTINUED | OUTPATIENT
Start: 2017-01-01 | End: 2017-01-01 | Stop reason: HOSPADM

## 2017-01-01 RX ORDER — ACETAMINOPHEN 650 MG/1
650 SUPPOSITORY RECTAL EVERY 4 HOURS PRN
Status: DISCONTINUED | OUTPATIENT
Start: 2017-01-01 | End: 2017-01-01

## 2017-01-01 RX ORDER — ESMOLOL HYDROCHLORIDE 20 MG/ML
50-300 INJECTION, SOLUTION INTRAVENOUS CONTINUOUS
Status: DISCONTINUED | OUTPATIENT
Start: 2017-01-01 | End: 2017-01-01

## 2017-01-01 RX ORDER — MORPHINE SULFATE 2 MG/ML
2-4 INJECTION, SOLUTION INTRAMUSCULAR; INTRAVENOUS EVERY 6 HOURS PRN
Status: DISCONTINUED | OUTPATIENT
Start: 2017-01-01 | End: 2017-01-01

## 2017-01-01 RX ORDER — POLYETHYLENE GLYCOL 3350 17 G/17G
17 POWDER, FOR SOLUTION ORAL DAILY PRN
Status: DISCONTINUED | OUTPATIENT
Start: 2017-01-01 | End: 2017-01-01

## 2017-01-01 RX ORDER — NITROGLYCERIN 0.4 MG/1
0.4 TABLET SUBLINGUAL EVERY 5 MIN PRN
Status: DISCONTINUED | OUTPATIENT
Start: 2017-01-01 | End: 2017-01-01

## 2017-01-01 RX ORDER — ACETAMINOPHEN 325 MG/1
650 TABLET ORAL EVERY 4 HOURS PRN
Status: DISCONTINUED | OUTPATIENT
Start: 2017-01-01 | End: 2017-01-01

## 2017-01-01 RX ORDER — NALOXONE HYDROCHLORIDE 0.4 MG/ML
.1-.4 INJECTION, SOLUTION INTRAMUSCULAR; INTRAVENOUS; SUBCUTANEOUS
Status: DISCONTINUED | OUTPATIENT
Start: 2017-01-01 | End: 2017-01-01 | Stop reason: HOSPADM

## 2017-01-01 RX ORDER — HYDROMORPHONE HYDROCHLORIDE 1 MG/ML
.5-1 INJECTION, SOLUTION INTRAMUSCULAR; INTRAVENOUS; SUBCUTANEOUS
Status: DISCONTINUED | OUTPATIENT
Start: 2017-01-01 | End: 2017-01-01 | Stop reason: DRUGHIGH

## 2017-01-01 RX ORDER — PROCHLORPERAZINE MALEATE 5 MG
5 TABLET ORAL EVERY 6 HOURS PRN
Status: DISCONTINUED | OUTPATIENT
Start: 2017-01-01 | End: 2017-01-01 | Stop reason: HOSPADM

## 2017-01-01 RX ORDER — CEFAZOLIN SODIUM 1 G/50ML
1250 SOLUTION INTRAVENOUS EVERY 24 HOURS
Status: DISCONTINUED | OUTPATIENT
Start: 2017-01-01 | End: 2017-01-01

## 2017-01-01 RX ORDER — ATORVASTATIN CALCIUM 40 MG/1
40 TABLET, FILM COATED ORAL AT BEDTIME
Status: DISCONTINUED | OUTPATIENT
Start: 2017-01-01 | End: 2017-01-01

## 2017-01-01 RX ORDER — CEFAZOLIN SODIUM 1 G/50ML
1250 SOLUTION INTRAVENOUS ONCE
Status: COMPLETED | OUTPATIENT
Start: 2017-01-01 | End: 2017-01-01

## 2017-01-01 RX ORDER — MEROPENEM 500 MG/1
500 INJECTION, POWDER, FOR SOLUTION INTRAVENOUS EVERY 6 HOURS
Status: DISCONTINUED | OUTPATIENT
Start: 2017-01-01 | End: 2017-01-01

## 2017-01-01 RX ORDER — ONDANSETRON 4 MG/1
4 TABLET, ORALLY DISINTEGRATING ORAL EVERY 6 HOURS PRN
Status: DISCONTINUED | OUTPATIENT
Start: 2017-01-01 | End: 2017-01-01

## 2017-01-01 RX ORDER — LIDOCAINE 40 MG/G
CREAM TOPICAL
Status: DISCONTINUED | OUTPATIENT
Start: 2017-01-01 | End: 2017-01-01 | Stop reason: HOSPADM

## 2017-01-01 RX ORDER — METHYLPREDNISOLONE SODIUM SUCCINATE 125 MG/2ML
125 INJECTION, POWDER, LYOPHILIZED, FOR SOLUTION INTRAMUSCULAR; INTRAVENOUS ONCE
Status: COMPLETED | OUTPATIENT
Start: 2017-01-01 | End: 2017-01-01

## 2017-01-01 RX ORDER — METHYLPREDNISOLONE SODIUM SUCCINATE 125 MG/2ML
125 INJECTION, POWDER, LYOPHILIZED, FOR SOLUTION INTRAMUSCULAR; INTRAVENOUS EVERY 8 HOURS
Status: DISCONTINUED | OUTPATIENT
Start: 2017-01-01 | End: 2017-01-01 | Stop reason: ALTCHOICE

## 2017-01-01 RX ORDER — HYDROMORPHONE HYDROCHLORIDE 1 MG/ML
0.2 INJECTION, SOLUTION INTRAMUSCULAR; INTRAVENOUS; SUBCUTANEOUS
Status: DISCONTINUED | OUTPATIENT
Start: 2017-01-01 | End: 2017-01-01 | Stop reason: ALTCHOICE

## 2017-01-01 RX ADMIN — IPRATROPIUM BROMIDE AND ALBUTEROL SULFATE 3 ML: .5; 3 SOLUTION RESPIRATORY (INHALATION) at 08:41

## 2017-01-01 RX ADMIN — IPRATROPIUM BROMIDE AND ALBUTEROL SULFATE 3 ML: .5; 3 SOLUTION RESPIRATORY (INHALATION) at 14:41

## 2017-01-01 RX ADMIN — METHYLPREDNISOLONE SODIUM SUCCINATE 125 MG: 125 INJECTION, POWDER, FOR SOLUTION INTRAMUSCULAR; INTRAVENOUS at 20:23

## 2017-01-01 RX ADMIN — METOPROLOL TARTRATE 2.5 MG: 5 INJECTION INTRAVENOUS at 10:10

## 2017-01-01 RX ADMIN — POTASSIUM CHLORIDE 10 MEQ: 14.9 INJECTION, SOLUTION, CONCENTRATE PARENTERAL at 09:09

## 2017-01-01 RX ADMIN — POTASSIUM CHLORIDE 10 MEQ: 14.9 INJECTION, SOLUTION, CONCENTRATE PARENTERAL at 12:11

## 2017-01-01 RX ADMIN — DEXMEDETOMIDINE 0.2 MCG/KG/HR: 100 INJECTION, SOLUTION, CONCENTRATE INTRAVENOUS at 21:41

## 2017-01-01 RX ADMIN — MEROPENEM 500 MG: 500 INJECTION, POWDER, FOR SOLUTION INTRAVENOUS at 12:29

## 2017-01-01 RX ADMIN — MEROPENEM 500 MG: 500 INJECTION, POWDER, FOR SOLUTION INTRAVENOUS at 23:40

## 2017-01-01 RX ADMIN — VANCOMYCIN HYDROCHLORIDE 1250 MG: 5 INJECTION, POWDER, LYOPHILIZED, FOR SOLUTION INTRAVENOUS at 09:35

## 2017-01-01 RX ADMIN — HYDROMORPHONE HYDROCHLORIDE 0.2 MG: 1 INJECTION, SOLUTION INTRAMUSCULAR; INTRAVENOUS; SUBCUTANEOUS at 23:47

## 2017-01-01 RX ADMIN — METHYLPREDNISOLONE SODIUM SUCCINATE 125 MG: 125 INJECTION, POWDER, FOR SOLUTION INTRAMUSCULAR; INTRAVENOUS at 17:43

## 2017-01-01 RX ADMIN — METOPROLOL TARTRATE 2.5 MG: 5 INJECTION INTRAVENOUS at 19:13

## 2017-01-01 RX ADMIN — MEROPENEM 500 MG: 500 INJECTION, POWDER, FOR SOLUTION INTRAVENOUS at 17:40

## 2017-01-01 RX ADMIN — MORPHINE SULFATE 2 MG: 2 INJECTION, SOLUTION INTRAMUSCULAR; INTRAVENOUS at 14:49

## 2017-01-01 RX ADMIN — METOPROLOL SUCCINATE 50 MG: 50 TABLET, EXTENDED RELEASE ORAL at 20:34

## 2017-01-01 RX ADMIN — MORPHINE SULFATE 4 MG: 2 INJECTION, SOLUTION INTRAMUSCULAR; INTRAVENOUS at 11:15

## 2017-01-01 RX ADMIN — IPRATROPIUM BROMIDE AND ALBUTEROL SULFATE 3 ML: .5; 3 SOLUTION RESPIRATORY (INHALATION) at 07:32

## 2017-01-01 RX ADMIN — IPRATROPIUM BROMIDE AND ALBUTEROL SULFATE 3 ML: .5; 3 SOLUTION RESPIRATORY (INHALATION) at 11:57

## 2017-01-01 RX ADMIN — HYDROMORPHONE HYDROCHLORIDE 1 MG: 1 INJECTION, SOLUTION INTRAMUSCULAR; INTRAVENOUS; SUBCUTANEOUS at 15:05

## 2017-01-01 RX ADMIN — METOPROLOL TARTRATE 2.5 MG: 5 INJECTION INTRAVENOUS at 06:20

## 2017-01-01 RX ADMIN — MEROPENEM 500 MG: 500 INJECTION, POWDER, FOR SOLUTION INTRAVENOUS at 04:57

## 2017-01-01 RX ADMIN — IPRATROPIUM BROMIDE AND ALBUTEROL SULFATE 3 ML: .5; 3 SOLUTION RESPIRATORY (INHALATION) at 14:58

## 2017-01-01 RX ADMIN — HYDROMORPHONE HYDROCHLORIDE 2 MG: 1 INJECTION, SOLUTION INTRAMUSCULAR; INTRAVENOUS; SUBCUTANEOUS at 16:10

## 2017-01-01 RX ADMIN — METOPROLOL TARTRATE 10 MG: 5 INJECTION INTRAVENOUS at 13:32

## 2017-01-01 RX ADMIN — SODIUM CHLORIDE 200 MG: 9 INJECTION, SOLUTION INTRAVENOUS at 12:10

## 2017-01-01 RX ADMIN — HALOPERIDOL LACTATE 1 MG: 5 INJECTION, SOLUTION INTRAMUSCULAR at 20:33

## 2017-01-01 RX ADMIN — MEROPENEM 500 MG: 500 INJECTION, POWDER, FOR SOLUTION INTRAVENOUS at 17:29

## 2017-01-01 RX ADMIN — LORAZEPAM 0.5 MG: 2 INJECTION INTRAMUSCULAR; INTRAVENOUS at 11:25

## 2017-01-01 RX ADMIN — POTASSIUM CHLORIDE 10 MEQ: 14.9 INJECTION, SOLUTION, CONCENTRATE PARENTERAL at 13:27

## 2017-01-01 RX ADMIN — AMIODARONE HYDROCHLORIDE 200 MG: 200 TABLET ORAL at 17:06

## 2017-01-01 RX ADMIN — MEROPENEM 500 MG: 500 INJECTION, POWDER, FOR SOLUTION INTRAVENOUS at 05:20

## 2017-01-01 RX ADMIN — POTASSIUM CHLORIDE 10 MEQ: 14.9 INJECTION, SOLUTION, CONCENTRATE PARENTERAL at 06:09

## 2017-01-01 RX ADMIN — CEFTRIAXONE 1 G: 1 INJECTION, POWDER, FOR SOLUTION INTRAMUSCULAR; INTRAVENOUS at 09:38

## 2017-01-01 RX ADMIN — METOPROLOL SUCCINATE 50 MG: 50 TABLET, EXTENDED RELEASE ORAL at 20:24

## 2017-01-01 RX ADMIN — SODIUM CHLORIDE 200 MG: 9 INJECTION, SOLUTION INTRAVENOUS at 00:19

## 2017-01-01 RX ADMIN — METOPROLOL SUCCINATE 50 MG: 50 TABLET, EXTENDED RELEASE ORAL at 19:50

## 2017-01-01 RX ADMIN — VANCOMYCIN HYDROCHLORIDE 1250 MG: 5 INJECTION, POWDER, LYOPHILIZED, FOR SOLUTION INTRAVENOUS at 10:32

## 2017-01-01 RX ADMIN — METHYLPREDNISOLONE SODIUM SUCCINATE 125 MG: 125 INJECTION, POWDER, FOR SOLUTION INTRAMUSCULAR; INTRAVENOUS at 19:50

## 2017-01-01 RX ADMIN — HALOPERIDOL LACTATE 2 MG: 5 INJECTION, SOLUTION INTRAMUSCULAR at 20:01

## 2017-01-01 RX ADMIN — VANCOMYCIN HYDROCHLORIDE 125 MG: 100 INJECTION, POWDER, LYOPHILIZED, FOR SOLUTION INTRAVENOUS at 23:37

## 2017-01-01 RX ADMIN — HEPARIN SODIUM 600 UNITS/HR: 10000 INJECTION, SOLUTION INTRAVENOUS at 10:02

## 2017-01-01 RX ADMIN — POTASSIUM CHLORIDE 40 MEQ: 1.5 POWDER, FOR SOLUTION ORAL at 11:55

## 2017-01-01 RX ADMIN — VANCOMYCIN HYDROCHLORIDE 1000 MG: 1 INJECTION, SOLUTION INTRAVENOUS at 00:32

## 2017-01-01 RX ADMIN — IPRATROPIUM BROMIDE AND ALBUTEROL SULFATE 3 ML: .5; 3 SOLUTION RESPIRATORY (INHALATION) at 07:12

## 2017-01-01 RX ADMIN — POTASSIUM CHLORIDE 10 MEQ: 14.9 INJECTION, SOLUTION, CONCENTRATE PARENTERAL at 07:35

## 2017-01-01 RX ADMIN — HALOPERIDOL LACTATE 1 MG: 5 INJECTION, SOLUTION INTRAMUSCULAR at 13:08

## 2017-01-01 RX ADMIN — MORPHINE SULFATE 4 MG: 2 INJECTION, SOLUTION INTRAMUSCULAR; INTRAVENOUS at 03:58

## 2017-01-01 RX ADMIN — AMIODARONE HYDROCHLORIDE 200 MG: 200 TABLET ORAL at 12:19

## 2017-01-01 RX ADMIN — PIPERACILLIN AND TAZOBACTAM 4.5 G: 4; .5 INJECTION, POWDER, FOR SOLUTION INTRAVENOUS at 05:56

## 2017-01-01 RX ADMIN — METHYLPREDNISOLONE SODIUM SUCCINATE 125 MG: 125 INJECTION, POWDER, FOR SOLUTION INTRAMUSCULAR; INTRAVENOUS at 03:23

## 2017-01-01 RX ADMIN — LEVOFLOXACIN 750 MG: 5 INJECTION, SOLUTION INTRAVENOUS at 07:35

## 2017-01-01 RX ADMIN — HYDROMORPHONE HYDROCHLORIDE 1 MG: 1 INJECTION, SOLUTION INTRAMUSCULAR; INTRAVENOUS; SUBCUTANEOUS at 15:27

## 2017-01-01 RX ADMIN — METOPROLOL TARTRATE 2.5 MG: 5 INJECTION INTRAVENOUS at 02:16

## 2017-01-01 RX ADMIN — ASPIRIN 81 MG 81 MG: 81 TABLET ORAL at 09:32

## 2017-01-01 RX ADMIN — METHYLPREDNISOLONE SODIUM SUCCINATE 125 MG: 125 INJECTION, POWDER, FOR SOLUTION INTRAMUSCULAR; INTRAVENOUS at 06:30

## 2017-01-01 RX ADMIN — CEFTRIAXONE 1 G: 1 INJECTION, POWDER, FOR SOLUTION INTRAMUSCULAR; INTRAVENOUS at 08:25

## 2017-01-01 RX ADMIN — MORPHINE SULFATE 4 MG: 4 INJECTION, SOLUTION INTRAMUSCULAR; INTRAVENOUS at 13:32

## 2017-01-01 RX ADMIN — HYDROMORPHONE HYDROCHLORIDE 2 MG: 1 INJECTION, SOLUTION INTRAMUSCULAR; INTRAVENOUS; SUBCUTANEOUS at 15:14

## 2017-01-01 RX ADMIN — WARFARIN SODIUM 1 MG: 1 TABLET ORAL at 17:28

## 2017-01-01 RX ADMIN — LEVOFLOXACIN 750 MG: 5 INJECTION, SOLUTION INTRAVENOUS at 06:30

## 2017-01-01 RX ADMIN — IPRATROPIUM BROMIDE AND ALBUTEROL SULFATE 3 ML: .5; 3 SOLUTION RESPIRATORY (INHALATION) at 12:17

## 2017-01-01 RX ADMIN — HYDROMORPHONE HYDROCHLORIDE 0.2 MG: 1 INJECTION, SOLUTION INTRAMUSCULAR; INTRAVENOUS; SUBCUTANEOUS at 20:34

## 2017-01-01 RX ADMIN — METRONIDAZOLE 500 MG: 500 INJECTION, SOLUTION INTRAVENOUS at 10:40

## 2017-01-01 RX ADMIN — IPRATROPIUM BROMIDE AND ALBUTEROL SULFATE 3 ML: .5; 3 SOLUTION RESPIRATORY (INHALATION) at 20:10

## 2017-01-01 RX ADMIN — ASPIRIN 81 MG 81 MG: 81 TABLET ORAL at 12:19

## 2017-01-01 RX ADMIN — SODIUM CHLORIDE 200 MG: 9 INJECTION, SOLUTION INTRAVENOUS at 23:37

## 2017-01-01 RX ADMIN — METOPROLOL TARTRATE 2.5 MG: 5 INJECTION INTRAVENOUS at 11:28

## 2017-01-01 RX ADMIN — VANCOMYCIN HYDROCHLORIDE 125 MG: 100 INJECTION, POWDER, LYOPHILIZED, FOR SOLUTION INTRAVENOUS at 17:28

## 2017-01-01 RX ADMIN — HYDROMORPHONE HYDROCHLORIDE 0.2 MG: 1 INJECTION, SOLUTION INTRAMUSCULAR; INTRAVENOUS; SUBCUTANEOUS at 17:44

## 2017-01-01 RX ADMIN — HYDROMORPHONE HYDROCHLORIDE 0.5 MG: 1 INJECTION, SOLUTION INTRAMUSCULAR; INTRAVENOUS; SUBCUTANEOUS at 15:01

## 2017-01-01 RX ADMIN — IPRATROPIUM BROMIDE AND ALBUTEROL SULFATE 3 ML: .5; 3 SOLUTION RESPIRATORY (INHALATION) at 19:40

## 2017-01-01 RX ADMIN — ATORVASTATIN CALCIUM 40 MG: 40 TABLET, FILM COATED ORAL at 00:19

## 2017-01-01 RX ADMIN — MORPHINE SULFATE 4 MG: 2 INJECTION, SOLUTION INTRAMUSCULAR; INTRAVENOUS at 07:45

## 2017-01-01 RX ADMIN — IPRATROPIUM BROMIDE AND ALBUTEROL SULFATE 3 ML: .5; 3 SOLUTION RESPIRATORY (INHALATION) at 03:05

## 2017-01-01 RX ADMIN — MORPHINE SULFATE 2 MG: 2 INJECTION, SOLUTION INTRAMUSCULAR; INTRAVENOUS at 18:40

## 2017-01-01 RX ADMIN — DEXTROSE AND SODIUM CHLORIDE: 5; .9 INJECTION, SOLUTION INTRAVENOUS at 10:11

## 2017-01-01 RX ADMIN — ATORVASTATIN CALCIUM 40 MG: 40 TABLET, FILM COATED ORAL at 20:34

## 2017-01-01 RX ADMIN — METRONIDAZOLE 500 MG: 500 INJECTION, SOLUTION INTRAVENOUS at 22:49

## 2017-01-01 RX ADMIN — IPRATROPIUM BROMIDE AND ALBUTEROL SULFATE 3 ML: .5; 3 SOLUTION RESPIRATORY (INHALATION) at 13:14

## 2017-01-01 RX ADMIN — HYDROMORPHONE HYDROCHLORIDE 0.2 MG: 1 INJECTION, SOLUTION INTRAMUSCULAR; INTRAVENOUS; SUBCUTANEOUS at 20:33

## 2017-01-01 RX ADMIN — ATORVASTATIN CALCIUM 40 MG: 40 TABLET, FILM COATED ORAL at 23:36

## 2017-01-01 RX ADMIN — METOPROLOL TARTRATE 5 MG: 5 INJECTION INTRAVENOUS at 07:45

## 2017-01-01 RX ADMIN — HALOPERIDOL LACTATE 1 MG: 5 INJECTION, SOLUTION INTRAMUSCULAR at 20:34

## 2017-01-01 RX ADMIN — SODIUM CHLORIDE 200 MG: 9 INJECTION, SOLUTION INTRAVENOUS at 14:44

## 2017-01-01 RX ADMIN — MORPHINE SULFATE 4 MG: 4 INJECTION, SOLUTION INTRAMUSCULAR; INTRAVENOUS at 12:11

## 2017-01-01 RX ADMIN — POTASSIUM CHLORIDE 10 MEQ: 14.9 INJECTION, SOLUTION, CONCENTRATE PARENTERAL at 11:15

## 2017-01-01 RX ADMIN — MEROPENEM 500 MG: 500 INJECTION, POWDER, FOR SOLUTION INTRAVENOUS at 11:30

## 2017-01-01 RX ADMIN — METHYLPREDNISOLONE SODIUM SUCCINATE 125 MG: 125 INJECTION, POWDER, FOR SOLUTION INTRAMUSCULAR; INTRAVENOUS at 11:55

## 2017-01-01 RX ADMIN — WARFARIN SODIUM 1 MG: 1 TABLET ORAL at 17:07

## 2017-01-01 RX ADMIN — HYDROMORPHONE HYDROCHLORIDE 0.2 MG: 1 INJECTION, SOLUTION INTRAMUSCULAR; INTRAVENOUS; SUBCUTANEOUS at 04:56

## 2017-01-01 RX ADMIN — MORPHINE SULFATE 4 MG: 2 INJECTION, SOLUTION INTRAMUSCULAR; INTRAVENOUS at 00:35

## 2017-01-01 RX ADMIN — MORPHINE SULFATE 4 MG: 2 INJECTION, SOLUTION INTRAMUSCULAR; INTRAVENOUS at 10:04

## 2017-01-01 RX ADMIN — METOPROLOL TARTRATE 2.5 MG: 5 INJECTION INTRAVENOUS at 10:40

## 2017-01-01 RX ADMIN — FUROSEMIDE 40 MG: 10 INJECTION, SOLUTION INTRAVENOUS at 15:32

## 2017-01-01 RX ADMIN — ACETAMINOPHEN 650 MG: 325 TABLET, FILM COATED ORAL at 12:19

## 2017-01-01 RX ADMIN — HYDROMORPHONE HYDROCHLORIDE 0.2 MG: 1 INJECTION, SOLUTION INTRAMUSCULAR; INTRAVENOUS; SUBCUTANEOUS at 13:17

## 2017-01-01 RX ADMIN — IPRATROPIUM BROMIDE AND ALBUTEROL SULFATE 3 ML: .5; 3 SOLUTION RESPIRATORY (INHALATION) at 20:18

## 2017-01-01 RX ADMIN — HALOPERIDOL LACTATE 1 MG: 5 INJECTION, SOLUTION INTRAMUSCULAR at 13:17

## 2017-01-01 RX ADMIN — METHYLPREDNISOLONE SODIUM SUCCINATE 125 MG: 125 INJECTION, POWDER, FOR SOLUTION INTRAMUSCULAR; INTRAVENOUS at 13:24

## 2017-01-01 RX ADMIN — POTASSIUM CHLORIDE 10 MEQ: 14.9 INJECTION, SOLUTION, CONCENTRATE PARENTERAL at 06:11

## 2017-01-01 RX ADMIN — MORPHINE SULFATE 4 MG: 2 INJECTION, SOLUTION INTRAMUSCULAR; INTRAVENOUS at 22:48

## 2017-01-01 RX ADMIN — IPRATROPIUM BROMIDE AND ALBUTEROL SULFATE 3 ML: .5; 3 SOLUTION RESPIRATORY (INHALATION) at 10:56

## 2017-01-01 RX ADMIN — HALOPERIDOL LACTATE 1 MG: 5 INJECTION, SOLUTION INTRAMUSCULAR at 18:56

## 2017-01-01 RX ADMIN — IPRATROPIUM BROMIDE AND ALBUTEROL SULFATE 3 ML: .5; 3 SOLUTION RESPIRATORY (INHALATION) at 20:07

## 2017-01-01 RX ADMIN — METHYLPREDNISOLONE SODIUM SUCCINATE 125 MG: 125 INJECTION, POWDER, FOR SOLUTION INTRAMUSCULAR; INTRAVENOUS at 12:19

## 2017-01-01 RX ADMIN — POTASSIUM CHLORIDE 10 MEQ: 14.9 INJECTION, SOLUTION, CONCENTRATE PARENTERAL at 07:01

## 2017-01-01 RX ADMIN — METRONIDAZOLE 500 MG: 500 INJECTION, SOLUTION INTRAVENOUS at 17:47

## 2017-01-01 RX ADMIN — MORPHINE SULFATE 4 MG: 4 INJECTION, SOLUTION INTRAMUSCULAR; INTRAVENOUS at 13:20

## 2017-01-01 RX ADMIN — HYDROMORPHONE HYDROCHLORIDE 0.2 MG: 1 INJECTION, SOLUTION INTRAMUSCULAR; INTRAVENOUS; SUBCUTANEOUS at 17:29

## 2017-01-01 RX ADMIN — PIPERACILLIN AND TAZOBACTAM 4.5 G: 4; .5 INJECTION, POWDER, FOR SOLUTION INTRAVENOUS at 13:21

## 2017-01-01 RX ADMIN — HALOPERIDOL LACTATE 2 MG: 5 INJECTION, SOLUTION INTRAMUSCULAR at 15:01

## 2017-01-01 RX ADMIN — MEROPENEM 500 MG: 500 INJECTION, POWDER, FOR SOLUTION INTRAVENOUS at 23:31

## 2017-01-01 RX ADMIN — HALOPERIDOL LACTATE 2 MG: 5 INJECTION, SOLUTION INTRAMUSCULAR at 23:12

## 2017-01-01 RX ADMIN — PIPERACILLIN AND TAZOBACTAM 4.5 G: 4; .5 INJECTION, POWDER, FOR SOLUTION INTRAVENOUS at 19:48

## 2017-01-01 RX ADMIN — METRONIDAZOLE 500 MG: 500 INJECTION, SOLUTION INTRAVENOUS at 10:04

## 2017-01-01 RX ADMIN — PIPERACILLIN AND TAZOBACTAM 4.5 G: 4; .5 INJECTION, POWDER, FOR SOLUTION INTRAVENOUS at 02:09

## 2017-01-01 RX ADMIN — IPRATROPIUM BROMIDE AND ALBUTEROL SULFATE 3 ML: .5; 3 SOLUTION RESPIRATORY (INHALATION) at 08:44

## 2017-01-01 RX ADMIN — FUROSEMIDE 40 MG: 10 INJECTION, SOLUTION INTRAMUSCULAR; INTRAVENOUS at 11:26

## 2017-01-01 RX ADMIN — POTASSIUM CHLORIDE 10 MEQ: 14.9 INJECTION, SOLUTION, CONCENTRATE PARENTERAL at 07:25

## 2017-01-01 RX ADMIN — METRONIDAZOLE 500 MG: 500 INJECTION, SOLUTION INTRAVENOUS at 04:03

## 2017-01-01 RX ADMIN — HYDROMORPHONE HYDROCHLORIDE 0.2 MG: 1 INJECTION, SOLUTION INTRAMUSCULAR; INTRAVENOUS; SUBCUTANEOUS at 23:49

## 2017-01-01 RX ADMIN — Medication 0.5 MG: at 17:55

## 2017-01-01 RX ADMIN — IPRATROPIUM BROMIDE AND ALBUTEROL SULFATE 3 ML: .5; 3 SOLUTION RESPIRATORY (INHALATION) at 19:11

## 2017-01-01 RX ADMIN — POTASSIUM CHLORIDE 20 MEQ: 1.5 POWDER, FOR SOLUTION ORAL at 13:58

## 2017-01-01 RX ADMIN — PIPERACILLIN AND TAZOBACTAM 4.5 G: 4; .5 INJECTION, POWDER, FOR SOLUTION INTRAVENOUS at 09:29

## 2017-01-01 RX ADMIN — AMIODARONE HYDROCHLORIDE 200 MG: 200 TABLET ORAL at 09:32

## 2017-01-01 RX ADMIN — VANCOMYCIN HYDROCHLORIDE 125 MG: 100 INJECTION, POWDER, LYOPHILIZED, FOR SOLUTION INTRAVENOUS at 20:43

## 2017-01-01 RX ADMIN — VANCOMYCIN HYDROCHLORIDE 125 MG: 100 INJECTION, POWDER, LYOPHILIZED, FOR SOLUTION INTRAVENOUS at 12:55

## 2017-01-01 RX ADMIN — VANCOMYCIN HYDROCHLORIDE 125 MG: 100 INJECTION, POWDER, LYOPHILIZED, FOR SOLUTION INTRAVENOUS at 12:19

## 2017-01-01 RX ADMIN — METHYLPREDNISOLONE SODIUM SUCCINATE 125 MG: 125 INJECTION, POWDER, FOR SOLUTION INTRAMUSCULAR; INTRAVENOUS at 04:23

## 2017-01-01 RX ADMIN — ASPIRIN 81 MG 81 MG: 81 TABLET ORAL at 17:06

## 2017-01-01 RX ADMIN — HYDROMORPHONE HYDROCHLORIDE 0.2 MG: 1 INJECTION, SOLUTION INTRAMUSCULAR; INTRAVENOUS; SUBCUTANEOUS at 02:16

## 2017-01-01 RX ADMIN — POTASSIUM CHLORIDE 10 MEQ: 14.9 INJECTION, SOLUTION, CONCENTRATE PARENTERAL at 10:08

## 2017-01-01 RX ADMIN — VANCOMYCIN HYDROCHLORIDE 125 MG: 100 INJECTION, POWDER, LYOPHILIZED, FOR SOLUTION INTRAVENOUS at 17:55

## 2017-01-01 ASSESSMENT — ENCOUNTER SYMPTOMS
COUGH: 1
FEVER: 1
FEVER: 0
VOMITING: 0
NAUSEA: 0
SHORTNESS OF BREATH: 1
DIARRHEA: 0
ABDOMINAL PAIN: 0

## 2017-01-13 NOTE — ED AVS SNAPSHOT
Emergency Department    [address] [facility] [address]    Phone:  [phone]    Fax:  [phone]                                       Harjit Garcia   MRN: [id number]    Department:   Emergency Department   Date of Visit:  1/13/2017           Patient Information     Date Of Birth          1940        Your diagnoses for this visit were:     Dislodged gastrostomy tube (H)        You were seen by Arabella Morfin MD.      Follow-up Information     Follow up with Fred Mary MD In 2 days.    Specialty:  Family Practice    Contact information:    [facility]  [address]  [place] [address]  [phone]        Discharge References/Attachments     FEEDING TUBE (TAKING MEDICATION), DISCHARGE INSTRUCTIONS (Uruguayan)      Future Appointments        Provider Department Dept Phone Center    1/30/2017 10:45 AM Aneesh Schwab MD, MD [facility] [place] [phone] [facility] CLIN      24 Hour Appointment Hotline       To make an appointment at any [facility], call [phone] ([phone]). If you don't have a family doctor or clinic, we will help you find one. [facility] are conveniently located to serve the needs of you and your family.             Review of your medicines      Our records show that you are taking the medicines listed below. If these are incorrect, please call your family doctor or clinic.        Dose / Directions Last dose taken    albuterol 108 (90 BASE) MCG/ACT Inhaler   Commonly known as:  PROAIR HFA/PROVENTIL HFA/VENTOLIN HFA   Dose:  6 puff        Inhale 6 puffs into the lungs every 4 hours as needed for shortness of breath / dyspnea   Refills:  0        amiodarone 200 MG tablet   Commonly known as:  PACERONE/CODARONE   Dose:  200 mg   Quantity:  60 tablet        Take 1 tablet (200 mg) by mouth daily   Refills:  1        aspirin 81 MG chewable tablet   Dose:  81 mg   Quantity:  36 tablet         Take 1 tablet (81 mg) by mouth daily   Refills:  0        cloNIDine 0.1 MG tablet   Commonly known as:  CATAPRES   Dose:  0.1 mg   Quantity:  30 tablet        Take 1 tablet (0.1 mg) by mouth daily   Refills:  0        ferrous sulfate 325 (65 FE) MG tablet   Commonly known as:  IRON   Dose:  325 mg   Quantity:  100 tablet        Take 1 tablet (325 mg) by mouth 2 times daily (with meals)   Refills:  0        fiber modular packet   Dose:  1 packet   Quantity:  14 packet        1 packet by Per Feeding Tube route every 12 hours   Refills:  0        FLOMAX PO   Dose:  0.4 mg        Take 0.4 mg by mouth every evening   Refills:  0        FOLIC ACID PO   Dose:  1 mg        Take 1 mg by mouth daily   Refills:  0        haloperidol lactate 5 MG/ML injection   Commonly known as:  HALDOL   Dose:  1 mg   Quantity:  36 mL        Inject 0.2 mLs (1 mg) into the vein every 6 hours as needed for agitation   Refills:  0        hydrogen peroxide 3 % solution        Apply topically every 8 hours as needed for wound care   Refills:  0        ipratropium - albuterol 0.5 mg/2.5 mg/3 mL 0.5-2.5 (3) MG/3ML neb solution   Commonly known as:  DUONEB   Dose:  3 mL   Quantity:  360 mL        Take 1 vial (3 mLs) by nebulization every 4 hours   Refills:  0        LIPITOR PO   Dose:  40 mg        Take 40 mg by mouth At Bedtime   Refills:  0        loperamide 2 MG capsule   Commonly known as:  IMODIUM   Dose:  2 mg   Quantity:  20 capsule        Take 1 capsule (2 mg) by mouth once as needed for diarrhea (May give 4 hours after 4mg dose if needed.)   Refills:  0        LUMBAR SUPPORT CUSHION        As directed   Refills:  0        magnesium sulfate 2-0.9 GM/50ML-% Soln   Dose:  2 g        Inject 50 mLs (2 g) into the vein daily as needed for magnesium supplementation (For Serum Mg++ 1.6 - 1.9 mg/dL)   Refills:  0        melatonin 1 MG Tabs tablet   Dose:  1 mg        Take 1 tablet (1 mg) by mouth At Bedtime   Refills:  0        metoprolol 10  mg/mL   Commonly known as:  LOPRESSOR   Dose:  75 mg        Take 7.5 mLs (75 mg) by mouth 3 times daily   Refills:  0        multivitamin Tabs tablet   Dose:  1 tablet        Take 1 tablet by mouth 2 times daily   Refills:  0        nicotine 14 MG/24HR 24 hr patch   Commonly known as:  NICODERM CQ   Dose:  1 patch        Place 1 patch onto the skin every 24 hours   Refills:  0        OLANZapine 2.5 MG tablet   Commonly known as:  zyPREXA   Dose:  2.5 mg   Quantity:  60 tablet        Take 1 tablet (2.5 mg) by mouth 2 times daily   Refills:  0        ondansetron 4 MG ODT tab   Commonly known as:  ZOFRAN-ODT   Dose:  4 mg   Quantity:  120 tablet        Take 1 tablet (4 mg) by mouth every 6 hours as needed for nausea   Refills:  0        pantoprazole 2 mg/mL suspension   Commonly known as:  PROTONIX   Dose:  40 mg        20 mLs (40 mg) by Per Feeding Tube route daily   Refills:  0        saccharomyces boulardii 250 MG capsule   Commonly known as:  FLORASTOR   Dose:  250 mg   Quantity:  14 capsule        Take 1 capsule (250 mg) by mouth 2 times daily   Refills:  0        senna-docusate 8.6-50 MG per tablet   Commonly known as:  SENOKOT-S;PERICOLACE   Dose:  1-2 tablet   Quantity:  100 tablet        Take 1-2 tablets by mouth 2 times daily   Refills:  0        sertraline 100 MG tablet   Commonly known as:  ZOLOFT   Dose:  100 mg   Quantity:  30 tablet        1 tablet (100 mg) by Per G Tube route daily   Refills:  0        * sodium chloride (PF) 0.9% PF flush   Dose:  10-20 mL        10-20 mLs by Intracatheter route every hour as needed for line flush or post meds or blood draw   Refills:  0        * sodium chloride (PF) 0.9% PF flush   Dose:  10 mL        10 mLs by Intracatheter route every 7 days   Refills:  0        * sodium chloride (PF) 0.9% PF flush   Dose:  10 mL        10 mLs by Intracatheter route every 8 hours   Refills:  0        sodium chloride 0.65 % nasal spray   Commonly known as:  OCEAN   Dose:  2 spray         Spray 2 sprays into both nostrils daily as needed for congestion   Refills:  0        TYLENOL PO   Dose:  650 mg        Take 650 mg by mouth daily as needed for mild pain or fever   Refills:  0        voriconazole 40 MG/ML suspension   Commonly known as:  VFEND   Dose:  260 mg   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 Read the directions carefully, and ask your doctor or other care provider to review them with you.            Procedures and tests performed during your visit     CBC with platelets differential    Comprehensive metabolic panel    XR Abdomen 1 View    XR Chest 2 Views      Orders Needing Specimen Collection     None      Pending Results     No orders found from 1/12/2017 to 1/14/2017.            Pending Culture Results     No orders found from 1/12/2017 to 1/14/2017.       Test Results from your hospital stay           1/13/2017  7:17 PM - Interface, [employer] [employer] Results      Component Results     Component Value Ref Range & Units Status    WBC 9.4 4.0 - 11.0 10e9/L Final    RBC Count 3.18 (L) 4.4 - 5.9 10e12/L Final    Hemoglobin 9.9 (L) 13.3 - 17.7 g/dL Final    Hematocrit 29.1 (L) 40.0 - 53.0 % Final    MCV 92 78 - 100 fl Final    MCH 31.1 26.5 - 33.0 pg Final    MCHC 34.0 31.5 - 36.5 g/dL Final    RDW 16.5 (H) 10.0 - 15.0 % Final    Platelet Count 441 150 - 450 10e9/L Final    Diff Method Automated Method  Final    % Neutrophils 74.7 % Final    % Lymphocytes 12.3 % Final    % Monocytes 10.2 % Final    % Eosinophils 2.3 % Final    % Basophils 0.2 % Final    % Immature Granulocytes 0.3 % Final    Nucleated  RBCs 0 0 /100 Final    Absolute Neutrophil 7.0 1.6 - 8.3 10e9/L Final    Absolute Lymphocytes 1.2 0.8 - 5.3 10e9/L Final    Absolute Monocytes 1.0 0.0 - 1.3 10e9/L Final    Absolute Eosinophils 0.2 0.0 - 0.7 10e9/L Final    Absolute Basophils 0.0 0.0 - 0.2 10e9/L Final    Abs Immature Granulocytes 0.0 0 - 0.4 10e9/L Final    Absolute Nucleated RBC 0.0  Final         1/13/2017  7:41 PM - Interface, [employer] Results      Component Results     Component Value Ref Range & Units Status    Sodium 136 133 - 144 mmol/L Final    Potassium 4.2 3.4 - 5.3 mmol/L Final    Chloride 101 94 - 109 mmol/L Final    Carbon Dioxide 23 20 - 32 mmol/L Final    Anion Gap 12 3 - 14 mmol/L Final    Glucose 90 70 - 99 mg/dL Final    Urea Nitrogen 28 7 - 30 mg/dL Final    Creatinine 0.96 0.66 - 1.25 mg/dL Final    GFR Estimate 76 >60 mL/min/1.7m2 Final    Non  GFR Calc    GFR Estimate If Black >90   GFR Calc   >60 mL/min/1.7m2 Final    Calcium 8.8 8.5 - 10.1 mg/dL Final    Bilirubin Total 0.4 0.2 - 1.3 mg/dL Final    Albumin 2.5 (L) 3.4 - 5.0 g/dL Final    Protein Total 8.4 6.8 - 8.8 g/dL Final    Alkaline Phosphatase 192 (H) 40 - 150 U/L Final    ALT 49 0 - 70 U/L Final    AST 69 (H) 0 - 45 U/L Final                           1/13/2017  8:23 PM - Interface, Radiant Ib      Narrative     ABDOMEN ONE VIEW   1/13/2017 8:16 PM     HISTORY: G-tube check.    COMPARISON: None.        Impression     IMPRESSION: A gastrostomy tube is in place. A small amount of contrast  was injected into the gastrostomy tube, showing the gastrostomy tip  and balloon to be within the stomach. The visualized bowel gas pattern  is otherwise within normal limits.    SLICK LASSITER MD         1/13/2017  8:35 PM - Interface, Radiant Ib      Narrative     CHEST TWO VIEWS  1/13/2017 8:17 PM     HISTORY: Aspiration.    COMPARISON: 12/18/2016.        Impression     IMPRESSION: Left lung shows substantial clearing since previous  exam.  Cardiomegaly has decreased. Patient has been extubated. Right lung  shows decreased pulmonary infiltrates as well but to a lesser extent  than on the left. Some of the residual density could represent  scarring. Difficult to rule out new infiltrate.     VANITA HARPER MD                Clinical Quality Measure: Blood Pressure Screening     Your blood pressure was checked while you were in the emergency department today. The last reading we obtained was  BP: (!) 124/101 mmHg . Please read the guidelines below about what these numbers mean and what you should do about them.  If your systolic blood pressure (the top number) is less than 120 and your diastolic blood pressure (the bottom number) is less than 80, then your blood pressure is normal. There is nothing more that you need to do about it.  If your systolic blood pressure (the top number) is 120-139 or your diastolic blood pressure (the bottom number) is 80-89, your blood pressure may be higher than it should be. You should have your blood pressure rechecked within a year by a primary care provider.  If your systolic blood pressure (the top number) is 140 or greater or your diastolic blood pressure (the bottom number) is 90 or greater, you may have high blood pressure. High blood pressure is treatable, but if left untreated over time it can put you at risk for heart attack, stroke, or kidney failure. You should have your blood pressure rechecked by a primary care provider within the next 4 weeks.  If your provider in the emergency department today gave you specific instructions to follow-up with your doctor or provider even sooner than that, you should follow that instruction and not wait for up to 4 weeks for your follow-up visit.        Thank you for choosing [facility]       Thank you for choosing [facility] for your care. Our goal is always to provide you with excellent care. Hearing back from our patients is one way we can continue to improve our  "services. Please take a few minutes to complete the written survey that you may receive in the mail after you visit with us. Thank you!        [employer] Information     [employer] lets you send messages to your doctor, view your test results, renew your prescriptions, schedule appointments and more. To sign up, go to [email or web address] . Click on \"Log in\" on the left side of the screen, which will take you to the Welcome page. Then click on \"Sign up Now\" on the right side of the page.     You will be asked to enter the access code listed below, as well as some personal information. Please follow the directions to create your username and password.     Your access code is: [email or web address]  Expires: 2017 10:06 AM     Your access code will  in 90 days. If you need help or a new code, please call your [facility] clinic or [phone].        Care EveryWhere ID     This is your Care EveryWhere ID. This could be used by other organizations to access your [facility] medical records  [id number]        After Visit Summary       This is your record. Keep this with you and show to your community pharmacist(s) and doctor(s) at your next visit.                  "

## 2017-01-13 NOTE — ED AVS SNAPSHOT
Emergency Department    [address] [place] [address]    Phone:  [phone]    Fax:  [phone]                                       Harjit Garcia   MRN: [id number]    Department:   Emergency Department   Date of Visit:  1/13/2017           After Visit Summary Signature Page     I have received my discharge instructions, and my questions have been answered. I have discussed any challenges I see with this plan with the nurse or doctor.    ..........................................................................................................................................  Patient/Patient Representative Signature      ..........................................................................................................................................  Patient Representative Print Name and Relationship to Patient    ..................................................               ................................................  Date                                            Time    ..........................................................................................................................................  Reviewed by Signature/Title    ...................................................              ..............................................  Date                                                            Time

## 2017-01-14 NOTE — ED PROVIDER NOTES
History     Chief Complaint:    Gtube Problem       HPI   History was provided by the patient's daughter as the patient speaks primarily Pryde[place].    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 Per daughter, the patient had a g tube placed a few weeks ago which is due to be removed next week as he is no longer using it. He is able to tolerate soft PO intake and drink water. Three days ago his g tube began malfunctioning intermittently. The patient was at [facility] and daughter reports the g tube was changed today but since they left AMA they would not image it. She is concerned his g tube is in the wrong place and has not been working since 1800 last night. The patient has a cough which is new today and post nasal drainage but no fevers. 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 No history of aspiration. The patient has been short of breath since November and does not have new, acute shortness of breath. He does not have any pain.     Allergies:  Iodine-131  Pletal     Medications:    Melatonin  Aspirin  Amiodarone  Coumadin  Zoloft  Imodium  Protonix  Zyprexa  Zofran  Lipitor  Flomax      Past Medical History:    Hypertension  Hyperlipidemia  BPH  Respiratory arrest  Carotid artery stenosis  Diverticulosis  Depression  Atrial fibrillation   PAD  Macular degeneration  Orthostatic hypotension   NSTEMI    Past Surgical History:    IR stent   Lumbar facet nerve ablation  Bypass graft   Tracheostomy      Family History:    History reviewed. No pertinent family history.      Social History:  Marital Status:   Presents to the ED with daughter  Tobacco Use: Former smoker (Quit 9/2016)  Alcohol Use: Positive (1x/month)  PCP: Fred Mary        Review of Systems   Constitutional: Negative for fever.    HENT: Positive for postnasal drip.    Respiratory: Positive for cough.    All other systems reviewed and are negative.      Physical Exam   First Vitals:  BP: 106/53 mmHg  Pulse: 97  Heart Rate: 97  Temp: 97.7  F (36.5  C)  Resp: 18  Weight: 63.05 kg (139 lb)  SpO2: 90 %    Physical Exam    Physical Exam   Constitutional:  They appear well-developed and well-nourished. Alert, appropriately oriented.  HENT:   Mouth/Throat:   Oropharynx is clear and moist.   Eyes:    Conjunctivae normal and EOM are normal. Pupils are equal, round, and reactive to light.   Neck:    Normal range of motion. Patient has a closing trach site, there is no discharge, no erythema.   Cardiovascular: Normal rate, regular rhythm and normal heart sounds.  Exam reveals no gallop and no friction rub.  No murmur heard.  Pulmonary/Chest:  Effort normal and breath sounds normal. Patient has no wheezes. Patient has no rales. Trace crackles at the bases. No respiratory distress.  Abdominal:   Soft. Bowel sounds are normal. Patient exhibits no mass. There is no tenderness. There is no rebound and no guarding. Patient has a g tube in. The site appears clear, dry, and intact. No purulence, no surrounding erythema. Not appreciably tender.   Musculoskeletal:  Normal range of motion. Patient exhibits no edema.   Neurological:   Patient is alert and oriented to person, place, and time. No cranial nerve deficit or sensory deficit. GCS 15. Generally weak. Alert.  Skin:   Skin is warm and dry. No rash noted. No erythema.   Psychiatric:   Patient has a normal mood and affect. Patient's behavior is normal. Judgment and thought content normal.        Emergency Department Course     Imaging:  XR Chest 2 Views  Final Result  IMPRESSION: Left lung shows substantial clearing since previous exam.  Cardiomegaly has decreased. Patient has been extubated. Right lung  shows decreased pulmonary infiltrates as well but to a lesser extent  than on the left. Some of the  residual density could represent  scarring. Difficult to rule out new infiltrate.     XR Abdomen 1 View  Final Result  IMPRESSION: A gastrostomy tube is in place. A small amount of contrast  was injected into the gastrostomy tube, showing the gastrostomy tip  and balloon to be within the stomach. The visualized bowel gas pattern  is otherwise within normal limits.    Radiographic findings were communicated with the patient and family who voiced understanding of the findings.    Laboratory:  CBC:  WBC 9.4, HGB 9.9 (L),   CMP: albumin 2.5 (L), alkphos 192 (H), AST 69 (H), otherwise WNL (Creatinine 0.96)     Emergency Department Course:  Nursing notes and vitals reviewed.  I performed an exam of the patient as documented above.   A peripheral IV was established. Blood was drawn from the patient. This was sent for laboratory testing, findings above.   The patient was sent for a XR while in the emergency department, findings above.   Findings and plan explained to the patient and family. Patient discharged home with instructions regarding supportive care, medications, and reasons to return. The importance of close follow-up was reviewed.     Impression & Plan      Medical Decision Making:  Harjit Garcia is a 76 year old male who is brought in by his family member mostly for evaluating g tube placement. It sounds like they checked him out of [facility]MA today. His g tube fell out and they replaced it but because he signed out AMA they would not image it so his daughter brought him here. She otherwise states that he has had chronic shortness of breath. He was hospitalized for this and had a CABG back in December. She said there has been no acute change. He has otherwise been eating fine, no vomiting. He denies any chest pain or fever. No other acute complaints. Basic blood work had been obtained for my evaluation. His white blood cell count appears normal. He is chronically anemic. Renal function is  stable. Chest xray shows improving infiltrates. His g tube was evaluated with a small amount of oral contrast prior to an abdominal xray. This appears to be intact and in an appropriate place. At this point the patient's family desires to take him home now that they know that the g tube is in place. He frankly does not actually use the g tube but were told they had to wait 6 weeks to get it out. He does drink water without difficulty. It sounds like he does not aspirate this. He is eating food fairly normally as well so at this point I will discharge him to home. Patient's family states they are willing to take him home and do not want to take him back to [facility].       Diagnosis:    ICD-10-CM    1. Dislodged gastrostomy tube (H) Z43.1        Disposition:  Discharge to home.     Tia Olivas  1/13/2017    EMERGENCY DEPARTMENT    MARIBEL, Tia Olivas, am serving as a scribe on 1/13/2017 at 6:44 PM to personally document services performed by Dr. Morfin based on my observations and the provider's statements to me.        Arabella Morfin MD  01/14/17 [id number]

## 2017-01-16 NOTE — TELEPHONE ENCOUNTER
Talked with Dory (patient's daughter) about taking Mr. Garcia out of [facility]. They will see PCP tomorrow at 1PM for INR, possible outpatient cardiac rehab vs home care. I asked that they have a formal note written to address the current nutritional status of Harjit as we will help coordinate removal of PEG tube as it is currently clogged and has not been used for over 1 weeks now.   Dory was given our ph# to contact with any further concerns    Renu Weber PA-C  [facility]

## 2017-01-23 NOTE — PROGRESS NOTES
Patient's daughter Dory called regarding G-tube removal schedule. Patient saw primary care last week with labs. All looked with in normal limits. Daughter states her father is eating,drinking and taking po medication with out problems. Bowels working well. Scheduled follow up appointment with Renu PAC this afternoon and G-tube removal for 1/27. Information relayed to daughter. States understanding.

## 2017-01-27 NOTE — PROGRESS NOTES
CV Surgery Post Op Follow Up Note:     S:  From discharge summary: On November 27, 2016 Mr. Garcia successfully underwent CORONARY ARTERY BYPASS GRAFTING X1 (LIMA TO LAD) (ON PUMP-WARM AND BEATING) by Dr. Mihir Hand. Note this patient has 99% L main and due to his PVD with recent fem-op bypass, surgery was only performed on LAD (left anterior descending) coronary artery due to limited conduit. He had bilateral fem/pop bypass early in November by Dr. Sood- left CFA-BK popliteal In-situ on 11-11-16 with no complications.  Prior right fem-AK popliteal In-Situ.    Cardiology followed as his L main and Obtuse Marginal branches will need to be stented (per last recommendations in 6 weeks if stronger and medically stable).    He has a history of pneumonia around 10 years ago where he had a tracheostomy performed as he was unable to wean from the ventilator.    We were unable to wean him from the ventilator and thus a redo tracheostomy by thoracic surgery was performed along with G tube placement by general surgery.    He has a history of bronchiectasis and hemoptysis but neg on bronchoscopy. 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 He is treated with voriconazole and ID should follow with him at [facility]. He was an active smoker up until surgery smoking about 1-2 ppd per family.    He had numerous secretions post operatively and was serially bronched for increased secretions. He has been stable on the ventilator 19 days currently. He last weaned 12/17/16 for 2 hours for pressure support trail.     He was trached as listed above with G tube placement.    He has a +hx of paroxysmal atrial fibrillation and was placed on heparin and amiodarone gtt's with coumadin. His INR at discharge is 1.58 with a goal of 2-3. He should follow up with cardiology for amiodarone and coumadin treatment duration.    He had  "post operative/blood loss anemia and was treated with blood products. He was last transfused 12/15/16 with 1 U PRBCs as his Hgb 7.1 (nosocomial anemia).    He had issues with fluid overload and was treated with diuretics and potassium. At discharge he is 1 kg below his pre-op weight and is not sent with any further diuretics. He may need a low dose lasix with potassium from time to time. Closely monitor his weight (pre-op weight 64kg).    Had transient hyperglycemia treated with an insulin gtt, transitioned to SSI and he has no need at discharge.    He struggled with ICU delirium. He was treated with olanzapine. Head CT negative and is back to baseline at discharge.    Had some diarrhea with tube feeds. Cdiff negative.    Escobar catheter left in place as limited mobility, accurate Is & Os, should be closely monitored for infection/UTI.  He had slt leukocytosis that was was closely monitored. At discharge he has WBC: 10.3K.    PT/OT worked with patient but he is easily fatigued. Thus with his ongoing medical issue and rehab needs, he is discharged to [facility] for further medical management. This patient was discussed with Dr. Rich at [facility] via phone report.      He is doing very well and is happy to be back home.     Allergies:   Allergies   Allergen Reactions     Iodine-131 Hives     Patient became red and got \"spots\" on his body after having CT dye.     Nuts [Peanut-Derived]      Neri walnuts     Pletal [Cilostazol]      Found in H and P       Medications:   Current outpatient prescriptions:      PANTOPRAZOLE SODIUM PO, Take 40 mg by mouth every morning (before breakfast), Disp: , Rfl:      METOPROLOL SUCCINATE ER PO, Take 50 mg by mouth every evening, Disp: , Rfl:      aspirin 81 MG chewable tablet, Take 1 tablet (81 mg) by mouth daily, Disp: 36 tablet, Rfl:      amiodarone (PACERONE/CODARONE) 200 MG tablet, Take 1 tablet (200 mg) by mouth daily, Disp: 60 tablet, Rfl: 1     warfarin (COUMADIN) 5 MG tablet, 1 " tablet (5 mg) by Per Feeding Tube route daily, Disp: 30 tablet, Rfl:      sertraline (ZOLOFT) 100 MG tablet, 1 tablet (100 mg) by Per G Tube route daily, Disp: 30 tablet, Rfl:      voriconazole (VFEND) 40 MG/ML suspension, 6.5 mLs (260 mg) by Per Feeding Tube route every 12 hours, Disp: 140 mL, Rfl:      Atorvastatin Calcium (LIPITOR PO), Take 40 mg by mouth At Bedtime, Disp: , Rfl:      ferrous sulfate (IRON) 325 (65 FE) MG tablet, Take 1 tablet (325 mg) by mouth 2 times daily (with meals), Disp: 100 tablet, Rfl: 0     multivitamin (OCUVITE) TABS, Take 1 tablet by mouth 2 times daily , Disp: , Rfl:      Tamsulosin HCl (FLOMAX PO), Take 0.4 mg by mouth every evening , Disp: , Rfl:      amiodarone (PACERONE/CODARONE) 200 MG tablet, Take 1 tablet (200 mg) by mouth daily, Disp: 60 tablet, Rfl: 1     melatonin 1 MG TABS tablet, Take 1 tablet (1 mg) by mouth At Bedtime, Disp: , Rfl:      albuterol (PROAIR HFA/PROVENTIL HFA/VENTOLIN HFA) 108 (90 BASE) MCG/ACT Inhaler, Inhale 6 puffs into the lungs every 4 hours as needed for shortness of breath / dyspnea, Disp: , Rfl:      ipratropium - albuterol 0.5 mg/2.5 mg/3 mL (DUONEB) 0.5-2.5 (3) MG/3ML neb solution, Take 1 vial (3 mLs) by nebulization every 4 hours, Disp: 360 mL, Rfl:      saccharomyces boulardii (FLORASTOR) 250 MG capsule, Take 1 capsule (250 mg) by mouth 2 times daily, Disp: 14 capsule, Rfl:      loperamide (IMODIUM) 2 MG capsule, Take 1 capsule (2 mg) by mouth once as needed for diarrhea (May give 4 hours after 4mg dose if needed.), Disp: 20 capsule, Rfl:      cloNIDine (CATAPRES) 0.1 MG tablet, Take 1 tablet (0.1 mg) by mouth daily, Disp: 30 tablet, Rfl:      hydrogen peroxide 3 % solution, Apply topically every 8 hours as needed for wound care, Disp: , Rfl:      metoprolol (LOPRESSOR) 10 mg/mL, Take 7.5 mLs (75 mg) by mouth 3 times daily, Disp: , Rfl:      fiber modular (NUTRISOURCE FIBER) packet, 1 packet by Per Feeding Tube route every 12 hours, Disp: 14  packet, Rfl:      senna-docusate (SENOKOT-S;PERICOLACE) 8.6-50 MG per tablet, Take 1-2 tablets by mouth 2 times daily, Disp: 100 tablet, Rfl:      pantoprazole (PROTONIX) 2 mg/mL suspension, 20 mLs (40 mg) by Per Feeding Tube route daily, Disp: , Rfl:      haloperidol lactate (HALDOL) 5 MG/ML injection, Inject 0.2 mLs (1 mg) into the vein every 6 hours as needed for agitation, Disp: 36 mL, Rfl:      sodium chloride, PF, 0.9% PF flush, 10-20 mLs by Intracatheter route every hour as needed for line flush or post meds or blood draw, Disp: , Rfl:      sodium chloride, PF, 0.9% PF flush, 10 mLs by Intracatheter route every 7 days, Disp: , Rfl:      sodium chloride, PF, 0.9% PF flush, 10 mLs by Intracatheter route every 8 hours, Disp: , Rfl:      ondansetron (ZOFRAN-ODT) 4 MG ODT tab, Take 1 tablet (4 mg) by mouth every 6 hours as needed for nausea, Disp: 120 tablet, Rfl:      magnesium sulfate 2-0.9 GM/50ML-% SOLN, Inject 50 mLs (2 g) into the vein daily as needed for magnesium supplementation (For Serum Mg++ 1.6 - 1.9 mg/dL), Disp: , Rfl:      OLANZapine (ZYPREXA) 2.5 MG tablet, Take 1 tablet (2.5 mg) by mouth 2 times daily, Disp: 60 tablet, Rfl:      nicotine (NICODERM CQ) 14 MG/24HR patch 2h hr, Place 1 patch onto the skin every 24 hours, Disp: , Rfl:      Acetaminophen (TYLENOL PO), Take 650 mg by mouth daily as needed for mild pain or fever, Disp: , Rfl:      FOLIC ACID PO, Take 1 mg by mouth daily, Disp: , Rfl:      sodium chloride (OCEAN) 0.65 % nasal spray, Spray 2 sprays into both nostrils daily as needed for congestion, Disp: , Rfl:      Misc. Devices (LUMBAR SUPPORT CUSHION), As directed, Disp: , Rfl:     Physical Exam: Vitals reviewed & retaken   Constitutional: healthy, alert and no distress  Lungs: clear  Cardiovascular: s1/s2, no m/r/g  Sternum: cdi  Extremities: no LE edema    Assessment/Plan:     Mr. Garcia is doing well in his post operative state. He left AMA from [facility] but does have 24/7 PCA and  home care. He has seen his PCP and had his G tube removed in clinic today by Nithya NP with IR. He understands to be NPO for 6 hours and keep occlusive dressing over his site for 24 hours. He will continue to follow up with his PCP and will follow up with Dr. Schwab on January 30, 2017. He is still on 2L NC and is weaning. He has been working with home care and rehab. His daughter Dory carefully looks after him and provides great care.     All of the patient's questions were answered. Our contact information was given to him if they should have any further questions/concerns. No further follow-up is needed with us.      Renu Weber PA-C  CV Surgery  [facility]  Pager: [phone]

## 2017-01-29 PROBLEM — J96.20 ACUTE ON CHRONIC RESPIRATORY FAILURE (H): Status: ACTIVE | Noted: 2017-01-01

## 2017-01-29 NOTE — ED NOTES
Placed on sticker pulse ox for continual monitoring. Introduced self to patient. Updated to POC and transfer upstairs.

## 2017-01-29 NOTE — PLAN OF CARE
Problem: Goal Outcome Summary  Goal: Goal Outcome Summary  Outcome: Therapy, progress towards functional goals is fair  SLP: Hx of COPD, chronic scarring of the lungs, CAD with bypass, aspergilloma; admitted for hypoxia and chills. Pt seen in room, currently on oxygen mask at 8L, sats at 95% and higher. NPO due to recent use of BiPAP and concern for aspiration. Trials of ice chips x3, thins by spoon, thins by cup, nectars by cup, purees and cracker with puree administered. Hyolaryngeal elevation mildly delayed in onset of swallow trigger and upon palpation it also seemed mildly discoordinated, but variably. Mild throat clearing noted following thins by cup and spoon. Not replicated with nectars, purees, semisolids or ice chips. Pt is mildly impulsive and takes 2-3 sips sequentially by cup, even when instructed to take one sip. For this reason, including acuity of illness and fragile respiratory state with questionable intermittent aspiration, RECS: dysphagia diet 2, nectar thick liquids by spoon only. Upright position in bed or chair for all PO intake, meds crushed or whole with purees. Small and slow bites and sips. Make NPO when on BiPAP or if respiratory status declines, or if coughing with PO intake increases. ST to follow for diet tolerance and education.

## 2017-01-29 NOTE — PLAN OF CARE
Problem: Goal Outcome Summary  Goal: Goal Outcome Summary  Outcome: No Change  VSS, sats now on 10lpm Nasal oxymizer. Denies pain, indio diet. Voids+ RR mid 20s LS Diminished shallow.

## 2017-01-29 NOTE — ED NOTES
Bed: ED04  Expected date: 1/29/17  Expected time: 2:45 AM  Means of arrival: Ambulance  Comments:   [facility] [phone] [id number] SOB

## 2017-01-29 NOTE — PHARMACY-ADMISSION MEDICATION HISTORY
Admission medication history interview status for the 1/29/2017  admission is complete. See EPIC admission navigator for prior to admission medications     Medication history source reliability:Good    Actions taken by pharmacist (provider contacted, etc):  Spoke w/ family and obtained medication list from significant other.  Medication list copied and placed in patient's bin in the ED.     Additional medication history information not noted on PTA med list :   Warfarin regimen:    Warfarin dosing as instructed by INR clinic     Last INR on 1/27/17 - instructed to hold warfarin on 1/28 and 1/29 and then resume Warfarin 1 mg every other day    Medication reconciliation/reorder completed by provider prior to medication history? Yes    Time spent in this activity: 25 minutes    Prior to Admission medications    Medication Sig Last Dose Taking? Auth Provider   LACTOBACILLUS PO Take 1 capsule by mouth 2 times daily 1/28/2017 at Unknown time Yes Unknown, Entered By History   LORAZEPAM PO Take 0.5 mg by mouth 2 times daily as needed for anxiety  at prn Yes Unknown, Entered By History   METOPROLOL SUCCINATE ER PO Take 50 mg by mouth every evening 1/28/2017 at Unknown time Yes Unknown, Entered By History   White Petrolatum ointment Apply topically 2 times daily 1/28/2017 at Unknown time Yes Unknown, Entered By History   SERTRALINE HCL PO Take 50 mg by mouth daily 1/28/2017 at Unknown time Yes Unknown, Entered By History   TRAZODONE HCL PO Take 100 mg by mouth At Bedtime 1/28/2017 at Unknown time Yes Unknown, Entered By History   VORICONAZOLE PO Take 250 mg by mouth 2 times daily 1/28/2017 at Unknown time Yes Unknown, Entered By History   WARFARIN SODIUM PO Take by mouth daily As directed by INR clinic 1/26/2017 Yes Unknown, Entered By History   multivitamin, therapeutic with minerals (THERA-VIT-M) TABS tablet Take 1 tablet by mouth daily 1/28/2017 at Unknown time Yes Unknown, Entered By History   amiodarone  (PACERONE/CODARONE) 200 MG tablet Take 1 tablet (200 mg) by mouth daily 1/28/2017 at Unknown time Yes Renu Weber PA-C   aspirin 81 MG chewable tablet Take 1 tablet (81 mg) by mouth daily 1/28/2017 at Unknown time Yes Renu Weber PA-C   albuterol (PROAIR HFA/PROVENTIL HFA/VENTOLIN HFA) 108 (90 BASE) MCG/ACT Inhaler Inhale 6 puffs into the lungs every 4 hours as needed for shortness of breath / dyspnea  at prn Yes Renu Weber PA-C   Atorvastatin Calcium (LIPITOR PO) Take 40 mg by mouth At Bedtime 1/28/2017 at Unknown time Yes Unknown, Entered By History   Acetaminophen (TYLENOL PO) Take 650 mg by mouth daily as needed for mild pain or fever  at prn Yes Reported, Patient   ferrous sulfate (IRON) 325 (65 FE) MG tablet Take 1 tablet (325 mg) by mouth 2 times daily (with meals) Past Week at Unknown time Yes Cruz Barbosa,    Tamsulosin HCl (FLOMAX PO) Take 0.4 mg by mouth every evening  1/28/2017 at Unknown time Yes Reported, Patient   multivitamin (OCUVITE) TABS Take 1 tablet by mouth 2 times daily    Reported, Patient   Misc. Devices (LUMBAR SUPPORT CUSHION) As directed   Reported, Patient     Marva Johnson, Mary CarmenD

## 2017-01-29 NOTE — ED PROVIDER NOTES
History     Chief Complaint:  Shortness of Breath    History was provided by the patient's daughter as the patient speaks primarily Latvian.  GLORIA Garcia is a 76 year old male with a history of a fib on Coumadin, CAD, emphysema, g tube feeding, status post CABG 11/27/16 who presents to the emergency department today with worsening shortness of breath. Daughter notes that patient has had persistent symptoms for the last 5 weeks in addition to longstanding difficulty breathing. Today, patient's oxygen saturations were noted to have dropped to 77% and he has required increase from 2 L to 3 L of home oxygenation. Patient was recently managed through rehab facility and had good improvement following discharge, per daughter; he was discharged from NH with tracheostomy for recurrent PNA and emphysema. Daughter notes difficulty weaning patient off of ventilator during rehab. EMS reported oxygen sats of 82% on 3L en route. Family also suspect possible fever, although they have not measured one at home. Patient denies chest pain, nausea, vomiting, diarrhea, or abdominal pain. No other concerns were voiced at this time.     Allergies:  Iodine-131  Pletal     Medications:     Melatonin  Aspirin  Amiodarone  Coumadin  Zoloft  Imodium  Protonix  Zyprexa  Zofran  Lipitor  Flomax      Past Medical History:     Hypertension  Hyperlipidemia  BPH  Respiratory arrest  Carotid artery stenosis  Diverticulosis  Depression  Atrial fibrillation    PAD  Macular degeneration  Orthostatic hypotension    NSTEMI    Past Surgical History:     IR stent    Lumbar facet nerve ablation  Bypass graft    Tracheostomy      Family History:     History reviewed. No pertinent family history.      Social History:  Marital Status:   Presents to the ED with daughter  Tobacco Use: Former smoker (Quit 9/2016)  Alcohol Use: Positive (1x/month)  PCP: Fred Mary     Review of Systems   Constitutional: Positive for fever.    Respiratory: Positive for shortness of breath.    Cardiovascular: Negative for chest pain.   Gastrointestinal: Negative for nausea, vomiting, abdominal pain and diarrhea.   All other systems reviewed and are negative.    Physical Exam     Patient Vitals for the past 24 hrs:   BP Temp Temp src Pulse Heart Rate Resp SpO2   01/29/17 0500 111/72 mmHg - - - 93 (!) 33 96 %   01/29/17 0430 130/58 mmHg - - - 93 (!) 38 97 %   01/29/17 0400 106/64 mmHg - - - 101 (!) 34 97 %   01/29/17 0349 102/59 mmHg - - - 100 24 97 %   01/29/17 0303 - - - - 107 - 93 %   01/29/17 0250 145/87 mmHg 99.5  F (37.5  C) Temporal 110 110 - -        Physical Exam  Constitutional:  Appears well-developed and well-nourished. Cooperative.      Pale. Looks uncomfortable.   HENT:   Head:    Atraumatic.   Mouth/Throat:   Oropharynx is without erythema or exudate and mucous     membranes are moist.   Eyes:    Conjunctivae normal and EOM are normal.      Pupils are equal, round, and reactive to light.   Neck:    Normal range of motion. Neck supple.   Cardiovascular:  Tachycardic, regular rhythm, normal heart sounds and radial and    dorsalis pedis pulses are 2+ and symmetric.    Pulmonary/Chest:  Labored respirations and accessory muscle use.     Diminished breath sounds throughout.  Abdominal:   Soft. Bowel sounds are normal.      No splenomegaly or hepatomegaly. No tenderness. No rebound.   Musculoskeletal:  Normal range of motion. No edema. No calf tenderness.   Neurological:  Alert. Normal strength. No cranial nerve deficit.   Skin:    Skin is warm and dry.   Psychiatric:   Normal mood and affect.     Emergency Department Course     ECG:  ECG taken at 0318, ECG read at 0321  Sinus tachycardia  Nonspecific ST and T wave abnormality  Abnormal ECG  SR replaced Afib with RVR  Rate slowed  Rate 105 bpm. IL interval 126. QRS duration 92. QT/QTc 292/385. P-R-T axes 50 0 108.    Imaging:  Radiology findings were communicated with the patient who voiced  understanding of the findings.    Portable chest xray 1 views:  Mild hazy opacities in the mid and inferior right lung and  less so in the inferior left lung. While these could be infectious or  inflammatory in etiology, a similar appearance was present on  1/13/2017 and therefore they could represent scarring.  Reading per radiology     Laboratory:  Laboratory findings were communicated with the patient who voiced understanding of the findings.  CBC: WBC 9.9, HGB 8.4(L), (H)   CMP: Glucose: 114(H), Calcium: 7.5(H), Albumin: 2.2(L), AST: 111(H), Creatinine 0.67  INR: 1.91(H)  Troponin: 0.016  BNP: 6870(H)  Lactic Acid: 1.3  UA: Protein Albumin: 10(A), Mucous Urine: present(A), Hyaline casts: 13(H), Calcium Oxalate: Few(A)  Arterial Blood Gas result:  pO2 44; pCO2 30; pH 7.50;  HCO3 23.    Blood culture: Pending    Interventions:  0305 Duoneb 3 mL Nebulization  0323 Solu-medrol 125 mg IV     Emergency Department Course:  Nursing notes and vitals reviewed.  I performed an exam of the patient as documented above.   IV was inserted and blood was drawn for laboratory testing, results above.  The patient was sent for a chest xray while in the emergency department, results above.     Patient was started on BiPAP.  At 0500 the patient was rechecked and family was updated on lab and imaging results.     I discussed the treatment plan with the patient. They expressed understanding of this plan and consented to admission. I discussed the patient with Dr Teixeira, who will admit the patient to a monitored bed for further evaluation and treatment.    I personally reviewed the laboratory results with the Patient and family, and answered all related questions prior to admission.    Impression & Plan      Medical Decision Making:  Patient presents with shortness of breath that's been present for the last couple of days, but acutely worse today. He is currently being treated for a fungal lung infection. He is fairly recently s/p  bilateral lower extremity bypass surgery followed by coronary artery bypass grafting. His post-op course from the bypass was complicated by pneumonia. Patient has a history of CHF and COPD. He is chronically oxygen dependent. Despite turning his oxygen levels up to the max at home, saturations were in the 80's for EMS arrival. On initial exam, the patient looks pale. He is tachypneic with labored breathing. Nebulizer treatments were ordered and the patient was placed on BiPAP. Chest x-ray looks stable without signs of new infiltrate. There is no vascular congestion suggesting CHF. His BNP however is high at 6870. Troponin was negative. His EKG has nonspecific T wave changes that are slightly more pronounced than his old EKG. His rate is converted from A-fib to sinus rhythm. The patient reported feeling hot, chilled, and sweaty. He is afebrile here, but sepsis labs were drawn. Blood cultures were obtained and pending. His lactate is negative. He is subtherapeutic on his coumadin with an INR of 1.91. Metabolic panel looks unremarkable. White cell count is within normal limits with a normal differential. Hemoglobin has drifted further downward and is 8.4 today. His pH is 7.5 with a low PCO2 and normal PO2. Urine analysis does not suggest infection.     With concern for COPD exacerbation, the patient dose a dose of IV solu-medrol with color and respiratory effort improved on BiPAP and with nebulizer treatments. However, the patient is significantly dyspneic when the BiPAP is removed. I discussed the case with Dr. Teixeira on-call for the hospitalist service. He accepted the patient for admission. He requested health care associated pneumonia antibiotics and first doses were ordered here in the ED as there is delay in transferring the patient to the floor.     I discussed tests results and plan for hospitalization with the patient and his wife and daughter who voiced understanding and agreement. He is admitted guarded  condition.     Diagnosis:    ICD-10-CM    1. COPD exacerbation (H) J44.1    2. Anemia, unspecified type D64.9    3. Acute on chronic congestive heart failure, unspecified congestive heart failure type (H) I50.9    4. Hypoxemia R09.02        Disposition:   Admission under hospitalist service.    Scribe Disclosure:  Yoni LÓPEZ, am serving as a scribe at 3:04 AM on 1/29/2017 to document services personally performed by Jaydon Vargas MD, based on my observations and the provider's statements to me.     EMERGENCY DEPARTMENT        Jaydon Vargas MD  01/29/17 0850

## 2017-01-29 NOTE — H&P
[facility]    History and Physical  Hospitalist       Date of Admission:  1/29/2017  Date of Service (when I saw the patient): 01/29/2017    Assessment and Plan  Harjit Garcia is a 76 year old male who presents with acute worsening of chronic hypoxia, chills.     Acute on chronic hypoxic respiratory failure: unclear etiology at this time as patient with chronic obstructive pulmonary disease, chronic scarring noted on chest x-ray which persists and is minimally changed from prior imaging studies, described chills and subjective fevers without objective data of this. At this time, most likely etiologies include viral upper respiratory infection with poor pulmonary reserve, COPD exacerbation, aspiration pneumonia/pneumonitis, healthcare associated pneumonia. 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  -given patient's baseline tenuous respiratory status, right lower lobe crackles, multiple recent hospitalizations, we will treat for healthcare associated pneumonia at this time. Patient is afebrile currently, but does describe chills.  -ID service consulted for assistance with antibiotic weaning and appropriate antifungal treatment while NPO  -Procalcitonin added on  -Respiratory virus panel; if positive, may allow for more rapid weaning of anti-infectives.  -Droplet isolation  -admitting to [facility] status on continuous BiPAP  -Pulmonology consulted given patient's poor pulmonary reserve with acute decompensation.  Primary pulmonologist is Dr. Aguirre  -Jon-Medrol 125 mg q.8 hours IV while n.p.o. on BiPAP  -Confirmed CODE STATUS at bedside, though low threshold for palliative care consultation if patient further decompensates.  -Speech pathology consulted as this may represent an aspiration event.    Coronary artery disease status post coronary artery bypass grafting with residual lesion: patient with bypass of LAD late November 2016, Though still  requires stenting of left main and obtuse marginal branches.Has follow-up 1/30/17 with Dr. Schwab for reassessment.   -Scheduled metoprolol 2.5 mg q.6 hours well oral metoprolol held. Additional IV metoprolol available for breakthrough tachycardia.  -Holding on cardiology consultation given patient's acute respiratory failure. As patient improves, could consider consult. Will miss 1/30/17 cardiology appointment with Dr. Schwab    Atrial fibrillation, paroxysmal:  -Heparin drip at this time given subtherapeutic INR and inability to continue Coumadin given BiPAP dependence  -Scheduled metoprolol as above with p.r.n. 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 This was discussed with patient and family at bedside    Disposition: Expected discharge in likely greater than 3 days pending weaning of anti-infectives, improvement in respiratory status.    García Ponce [place]    Primary Care Physician  Fred Mary    Chief Complaint  Acute shortness of breath, chills    History is obtained from the patient, chart review, patient's daughter and wife at bedside, Dr. Skelton in the emergency department. Patient's daughter serves as , waiver on file, as patient is primarily [place]-speaking     History of Present Illness  Harjit Garcia is a 76 year old male who presents with onset of increased work of breathing, chills, hypoxia to the  70% range noted at approximately 1:30 this afternoon. Patient with mild rigerous activity with chills described by wife this afternoon, though patient denies. Chills were a change over the past 48 hours. Patient has a cough, though this is chronic, not recently changed. Patient had a G-tube following his prolonged hospitalization, though this was removed following discharge from [facility] 1/13. Family denies any coughing or choking on food. Patient is eating 3 meals per day.  With hypoxia, family contacted medical providers, and were instructed to increase oxygen administration.With increase in oxygen to 4.5 L, patient still with hypoxia to 85% range, and subsequently brought to the emergency room permanent for further evaluation. No fevers measured at home, though patient does complain of feeling too warm alternating with described shaking chills. Patient with no urinary symptoms, though does have baseline urinary incontinence. No recent sick contacts, though family states that a PCA felt unwell after a shift approximately one week ago, was gone for one week with an illness. They were uncertain if this is a respiratory or GI illness.     Patient is an extremely complicated Past medical history With multiple recent hospitalizations including a prolonged hospitalization in November/December 2016.    Patient was intubated for 19 days (at time of discharge) following coronary artery bypass grafting November 27, 2016 with hospitalization complicated by delirium, subsequently discharged to [facility] where he left AMA 1/13/17. No records currently available from [facility] discharge; unclear how long patient remained intubated with tracheostomy at this facility.     Currently receives 24-hour per day PCA cares, and had been doing quite well in preceding weeks. Had been weaned to 2 L nasal cannula oxygen, though this was subsequently increased to 3 L over the past one week. G-tube had  been removed, and tracheostomy, removed at [facility], continues to heal as well.      Aside from recent coronary artery bypass grafting, patient also with popliteal femoral bypass graft Dr. Sood of vascular surgery on the rightAugust 3, 2016, on the left November 11, 2016.    More long-term, patient has a complicated pulmonary history including COPD, right upper lobe lung abscess for which he was initially treated with broad-spectrum antibiotics, converted to long-term anti-infectives for treatment of pulmonary aspergillosis; remains on Voriconazole currently.    Past Medical History   I have reviewed this patient's medical history and updated it with pertinent information if needed.   Past Medical History   Diagnosis Date     Hypertension      BPH (benign prostatic hyperplasia)      Respiratory arrest (H)      Insomnia      Vitamin D deficiency      Lumbar facet arthropathy (H)      Diverticulosis      Carotid artery stenosis      Depression      Afib (H)      Diverticulosis      PAD (peripheral artery disease) (H)      Macular degeneration      Orthostatic hypotension      NSTEMI (non-ST elevated myocardial infarction) (H)        Past Surgical History  I have reviewed this patient's surgical history and updated it with pertinent information if needed.  Past Surgical History   Procedure Laterality Date     Ir stent vascular rt       Bronchoscopy flexible and rigid N/A 3/18/2016     Procedure: BRONCHOSCOPY FLEXIBLE AND RIGID;  Surgeon: Roderick Aguirre MD;  Location:  GI     Vascular surgery       Orthopedic surgery       high frequency lumbar facet nerve ablation     Bypass graft insitu femoropopliteal Right 8/3/2016     Procedure: BYPASS GRAFT INSITU FEMOROPOPLITEAL;  Surgeon: Alli Sood MD;  Location:  OR     Endarterectomy femoral Right 8/3/2016     Procedure: ENDARTERECTOMY FEMORAL;  Surgeon: Alli Sood MD;  Location:  OR     Esophagoscopy, gastroscopy, duodenoscopy  (egd), combined N/A 9/5/2016     Procedure: COMBINED ESOPHAGOSCOPY, GASTROSCOPY, DUODENOSCOPY (EGD), BIOPSY SINGLE OR MULTIPLE;  Surgeon: Aziza Becker MD;  Location:  GI     Bypass graft femoropopliteal Left 11/11/2016     Procedure: BYPASS GRAFT FEMOROPOPLITEAL;  Surgeon: Alli Sood MD;  Location:  OR     Bypass graft artery coronary N/A 11/27/2016     Procedure: BYPASS GRAFT ARTERY CORONARY;  Surgeon: Mihir Hand MD;  Location:  OR     Tracheostomy N/A 12/7/2016     Procedure: TRACHEOSTOMY;  Surgeon: Hima Meade MD;  Location:  OR     Esophagoscopy, gastroscopy, duodenoscopy (egd), combined N/A 12/9/2016     Procedure: COMBINED ESOPHAGOSCOPY, GASTROSCOPY, DUODENOSCOPY (EGD), BIOPSY SINGLE OR MULTIPLE;  Surgeon: Beatrice Lindquist MD;  Location:  GI     Bronchoscopy flexible and rigid N/A 12/9/2016     Procedure: BRONCHOSCOPY FLEXIBLE AND RIGID;  Surgeon: Jer Cook MD;  Location:  GI       Prior to Admission Medications  Prior to Admission Medications   Prescriptions Last Dose Informant Patient Reported? Taking?   Acetaminophen (TYLENOL PO)  Spouse/Significant Other Yes No   Sig: Take 650 mg by mouth daily as needed for mild pain or fever   Atorvastatin Calcium (LIPITOR PO)   Yes No   Sig: Take 40 mg by mouth At Bedtime   FOLIC ACID PO  Spouse/Significant Other Yes No   Sig: Take 1 mg by mouth daily   METOPROLOL SUCCINATE ER PO   Yes No   Sig: Take 50 mg by mouth every evening   Misc. Devices (LUMBAR SUPPORT CUSHION)  Spouse/Significant Other Yes No   Sig: As directed   OLANZapine (ZYPREXA) 2.5 MG tablet   No No   Sig: Take 1 tablet (2.5 mg) by mouth 2 times daily   PANTOPRAZOLE SODIUM PO   Yes No   Sig: Take 40 mg by mouth every morning (before breakfast)   Tamsulosin HCl (FLOMAX PO)  Spouse/Significant Other Yes No   Sig: Take 0.4 mg by mouth every evening    albuterol (PROAIR HFA/PROVENTIL HFA/VENTOLIN HFA) 108 (90 BASE) MCG/ACT Inhaler   No  No   Sig: Inhale 6 puffs into the lungs every 4 hours as needed for shortness of breath / dyspnea   amiodarone (PACERONE/CODARONE) 200 MG tablet   No No   Sig: Take 1 tablet (200 mg) by mouth daily   amiodarone (PACERONE/CODARONE) 200 MG tablet   No No   Sig: Take 1 tablet (200 mg) by mouth daily   aspirin 81 MG chewable tablet   No No   Sig: Take 1 tablet (81 mg) by mouth daily   cloNIDine (CATAPRES) 0.1 MG tablet   No No   Sig: Take 1 tablet (0.1 mg) by mouth daily   ferrous sulfate (IRON) 325 (65 FE) MG tablet  Spouse/Significant Other No No   Sig: Take 1 tablet (325 mg) by mouth 2 times daily (with meals)   fiber modular (NUTRISOURCE FIBER) packet   No No   Si packet by Per Feeding Tube route every 12 hours   haloperidol lactate (HALDOL) 5 MG/ML injection   No No   Sig: Inject 0.2 mLs (1 mg) into the vein every 6 hours as needed for agitation   hydrogen peroxide 3 % solution   No No   Sig: Apply topically every 8 hours as needed for wound care   ipratropium - albuterol 0.5 mg/2.5 mg/3 mL (DUONEB) 0.5-2.5 (3) MG/3ML neb solution   No No   Sig: Take 1 vial (3 mLs) by nebulization every 4 hours   loperamide (IMODIUM) 2 MG capsule   No No   Sig: Take 1 capsule (2 mg) by mouth once as needed for diarrhea (May give 4 hours after 4mg dose if needed.)   magnesium sulfate 2-0.9 GM/50ML-% SOLN   No No   Sig: Inject 50 mLs (2 g) into the vein daily as needed for magnesium supplementation (For Serum Mg++ 1.6 - 1.9 mg/dL)   melatonin 1 MG TABS tablet   No No   Sig: Take 1 tablet (1 mg) by mouth At Bedtime   metoprolol (LOPRESSOR) 10 mg/mL   No No   Sig: Take 7.5 mLs (75 mg) by mouth 3 times daily   multivitamin (OCUVITE) TABS  Spouse/Significant Other Yes No   Sig: Take 1 tablet by mouth 2 times daily    nicotine (NICODERM CQ) 14 MG/24HR patch 2h hr   Yes No   Sig: Place 1 patch onto the skin every 24 hours   ondansetron (ZOFRAN-ODT) 4 MG ODT tab   No No   Sig: Take 1 tablet (4 mg) by mouth every 6 hours as needed for  "nausea   pantoprazole (PROTONIX) 2 mg/mL suspension   No No   Si mLs (40 mg) by Per Feeding Tube route daily   saccharomyces boulardii (FLORASTOR) 250 MG capsule   No No   Sig: Take 1 capsule (250 mg) by mouth 2 times daily   senna-docusate (SENOKOT-S;PERICOLACE) 8.6-50 MG per tablet   No No   Sig: Take 1-2 tablets by mouth 2 times daily   sertraline (ZOLOFT) 100 MG tablet   No No   Si tablet (100 mg) by Per G Tube route daily   sodium chloride (OCEAN) 0.65 % nasal spray  Spouse/Significant Other Yes No   Sig: Spray 2 sprays into both nostrils daily as needed for congestion   sodium chloride, PF, 0.9% PF flush   No No   Sig: 10-20 mLs by Intracatheter route every hour as needed for line flush or post meds or blood draw   sodium chloride, PF, 0.9% PF flush   No No   Sig: 10 mLs by Intracatheter route every 7 days   sodium chloride, PF, 0.9% PF flush   No No   Sig: 10 mLs by Intracatheter route every 8 hours   voriconazole (VFEND) 40 MG/ML suspension   No No   Si.5 mLs (260 mg) by Per Feeding Tube route every 12 hours   warfarin (COUMADIN) 5 MG tablet   No No   Si tablet (5 mg) by Per Feeding Tube route daily      Facility-Administered Medications: None     Allergies  Allergies   Allergen Reactions     Iodine-131 Hives     Patient became red and got \"spots\" on his body after having CT dye.     Nuts [Peanut-Derived]      Neri walnuts     Pletal [Cilostazol]      Found in H and P       Social History  I have reviewed this patient's social history and updated it with pertinent information if needed. Harjit Garcia  reports that he quit smoking about 4 months ago. He has never used smokeless tobacco. He reports that he drinks alcohol. He reports that he does not use illicit drugs.    Family History  I have reviewed this patient's family history and updated it with pertinent information if needed.   Mother with history of heart disease    Review of Systems  The 10 point Review of Systems is negative " other than noted in the HPI or here.   No abdominal discomfort  No change in quality of chronic cough    Physical Exam  Temp: 99.5  F (37.5  C) Temp src: Temporal BP: 130/58 mmHg Pulse: 110 Heart Rate: 93 Resp: (!) 38 SpO2: 97 % O2 Device: Non-rebreather mask Oxygen Delivery: 10 LPM  Vital Signs with Ranges  Temp:  [99.5  F (37.5  C)] 99.5  F (37.5  C)  Pulse:  [110] 110  Heart Rate:  [] 93  Resp:  [24-38] 38  BP: (102-145)/(58-87) 130/58 mmHg  SpO2:  [93 %-97 %] 97 %  0 lbs 0 oz    Constitutional: no acute distress, alert, conversant, chronically ill and somewhat cachectic appearing elderly male sitting in bed on BiPAP  Eyes: no scleral icterus or injection  HEENT: wasting of muscles of mastication  Respiratory: breath sounds with no wheezes, significant inspiratory crackles in right base which are asymmetric.  Cardiovascular: regular rate and rhythm, no murmur   GI: abdomen soft, non-tender, normoactive bowel sounds, no masses  Lymph/Hematologic: no lower extremity swelling  Genitourinary: not examined  Skin: no rashes. Prior G-tube site is well-healed currently, tracheostomy with dressing overlying, prior sternal incision is healing well with overlying scab. No surrounding erythema.  Musculoskeletal: diffuse muscular wasting in all extremities as well as muscles of mastication.  Neurologic: mental status grossly intact, no focal deficits, alert  Psychiatric: normal affect    Data  Data reviewed today:  I personally reviewed chest x-ray demonstrating bibasilar scarring, though more prominent in right lower lobe.    Recent Labs  Lab 01/29/17  0306   WBC 9.9   HGB 8.4*   MCV 89   *   INR 1.91*      POTASSIUM 3.6   CHLORIDE 105   CO2 26   BUN 11   CR 0.67   ANIONGAP 9   SHARI 7.5*   *   ALBUMIN 2.2*   PROTTOTAL 6.8   BILITOTAL 0.4   ALKPHOS 145   ALT 69   *   TROPI 0.016       Recent Results (from the past 24 hour(s))   XR Chest Port 1 View    Narrative    CHEST SINGLE VIEW PORTABLE    1/29/2017 3:30 AM     HISTORY: Shortness of breath.    COMPARISON: 1/13/2017.    FINDINGS: Mild hazy opacities in the mid and inferior right lung and  less so in the inferior left lung. Normal-sized cardiac silhouette.  Prior median sternotomy.      Impression    IMPRESSION: Mild hazy opacities in the mid and inferior right lung and  less so in the inferior left lung. While these could be infectious or  inflammatory in etiology, a similar appearance was present on  1/13/2017 and therefore they could represent scarring.    ANISH COX MD

## 2017-01-29 NOTE — PHARMACY-VANCOMYCIN DOSING SERVICE
Pharmacy Vancomycin Initial Note  Date of Service 2017  Patient's  1940  76 year old, male    Indication: Healthcare-Associated Pneumonia    Current estimated CrCl = Estimated Creatinine Clearance: 69.8 mL/min (based on Cr of 0.67).    Creatinine for last 3 days  2017:  3:06 AM Creatinine 0.67 mg/dL    Recent Vancomycin Level(s) for last 3 days  No results found for requested labs within last 3 days.      Vancomycin IV Administrations (past 72 hours)      No vancomycin orders with administrations in past 72 hours.                Nephrotoxins and other renal medications (Future)    Start     Dose/Rate Route Frequency Ordered Stop    17 1200  piperacillin-tazobactam (ZOSYN) 4.5 g vial to attach to  mL bag      4.5 g  over 1 Hours Intravenous EVERY 6 HOURS 17 0827      17 0555  vancomycin 1250 mg in 0.9% NaCl 250 mL PREMIX      1,250 mg Intravenous ONCE 17 0554            Contrast Orders - past 72 hours     None                Plan:  1.  Start vancomycin  1250 mg IV q24h.   2.  Goal Trough Level: 15-20 mg/L   3.  Pharmacy will check trough levels as appropriate in 1-3 Days.    4. Serum creatinine levels will be ordered daily for the first week of therapy and at least twice weekly for subsequent weeks.    5. [place] method utilized to dose vancomycin therapy: Method 1    Augustine Estes

## 2017-01-29 NOTE — PHARMACY-ANTICOAGULATION SERVICE
Clinical Pharmacy - Warfarin Dosing Consult     Pharmacy has been consulted to manage this patient s warfarin therapy.  Indication: Atrial Fibrillation  Therapy Goal: INR 2-3  Warfarin Prior to Admission: Yes  Warfarin PTA Regimen: instructed to hold 1/28 & 1/29 and resume 1mg every other day  Significant drug interactions: amiodarone    INR   Date Value Ref Range Status   01/29/2017 1.91* 0.86 - 1.14 Final   12/18/2016 1.58* 0.86 - 1.14 Final       Recommend warfarin 1 mg today.  Pharmacy will monitor Harjit Garcia daily and order warfarin doses to achieve specified goal.      Please contact pharmacy as soon as possible if the warfarin needs to be held for a procedure or if the warfarin goals change.

## 2017-01-29 NOTE — CONSULTS
[facility]    Infectious Disease Consultation     Date of Admission:  1/29/2017  Date of Consult (When I saw the patient): 01/29/2017    Assessment and Plan  Harjit Garcia is a 76 year old male who was admitted on 1/29/2017. I was asked to see the patient for HCAp and chronic Aspergillosis. .    Impression:   76 y.o male with chronic pulmonary nodules with pos cultures from an year ago and positive Galactomannan from last admission, currently on Voriconazole. Aspergillus did not grow in the cultures.   Also has baseline COPD was a smoker till the fall of 2016, more than 50 years of smoking.   In December was admitted with ACS but the stay was complicated with pneumonia acute bacterial ( Klebsiella and E coli on chronic inflammatory process of the lungs)   Was discharged to [facility] from where signed out AMA by family.   Admitted this admission with worsening respiratory status ( Hypoxic),  Some fevers, chills.   On IV vanco, zosyn, levaquin and voriconazole. Imaging unchanged from before.   What is being described as rigors per family appears to be jerky movement with sleep.     Recommendations:   Needs sputum cultures.   Probably bronch because has been on the broad spectrum antibiotics through out stay at [facility] ( more than 6 weeks of total broad spectrum antibiotics and, more than 8 weeks of voriconazole now) and during the stay at the [facility] and has not much improved his respiratory status.   For now continue on broad antimicrobials.   Will check voriconazole level.   ? Neurology consult for abnormal movements in sleep.       Ted Gardner MD    Reason for Consult  Reason for consult: I was asked by Dr. Teixeira to evaluate this patient for above mentioned.    Primary Care Physician  Fred Mary    Chief Complaint  Shortness of breath   Chills     History is obtained from the patient and medical records    History of Present Illness  Harjit Garcia is a 76 year old  male who presents with onset of increased work of breathing, chills, hypoxia to the 70% range noted at approximately 1:30 this afternoon. Patient with mild rigerous activity with chills described by wife this afternoon, though patient denies. Chills were a change over the past 48 hours. Patient has a cough, though this is chronic, not recently changed. Patient had a G-tube following his prolonged hospitalization, though this was removed following discharge from [facility] 1/13. Family denies any coughing or choking on food. Patient is eating 3 meals per day.  With hypoxia, family contacted medical providers, and were instructed to increase oxygen administration.With increase in oxygen to 4.5 L, patient still with hypoxia to 85% range, and subsequently brought to the emergency room permanent for further evaluation. No fevers measured at home, though patient does complain of feeling too warm alternating with described shaking chills.     Patient is an extremely complicated Past medical history With multiple recent hospitalizations including a prolonged hospitalization in November/December 2016.    Patient was intubated for 19 days (at time of discharge) following coronary artery bypass grafting November 27, 2016 with hospitalization complicated by delirium, subsequently discharged to [facility] where he left AMA 1/13/17. No records currently available from [facility] discharge; unclear how long patient remained intubated with tracheostomy at this facility.           Past Medical History  I have reviewed this patient's medical history and updated it with pertinent information if needed.   Past Medical History   Diagnosis Date     Hypertension      BPH (benign prostatic hyperplasia)      Respiratory arrest (H)      Insomnia      Vitamin D deficiency      Lumbar facet arthropathy (H)      Diverticulosis      Carotid artery stenosis      Depression      Afib (H)      Diverticulosis       PAD (peripheral artery disease) (H)      Macular degeneration      Orthostatic hypotension      NSTEMI (non-ST elevated myocardial infarction) (H)        Past Surgical History  I have reviewed this patient's surgical history and updated it with pertinent information if needed.  Past Surgical History   Procedure Laterality Date     Ir stent vascular rt       Bronchoscopy flexible and rigid N/A 3/18/2016     Procedure: BRONCHOSCOPY FLEXIBLE AND RIGID;  Surgeon: Roderick Aguirre MD;  Location:  GI     Vascular surgery       Orthopedic surgery       high frequency lumbar facet nerve ablation     Bypass graft insitu femoropopliteal Right 8/3/2016     Procedure: BYPASS GRAFT INSITU FEMOROPOPLITEAL;  Surgeon: Alli Sood MD;  Location:  OR     Endarterectomy femoral Right 8/3/2016     Procedure: ENDARTERECTOMY FEMORAL;  Surgeon: Alli Sood MD;  Location:  OR     Esophagoscopy, gastroscopy, duodenoscopy (egd), combined N/A 9/5/2016     Procedure: COMBINED ESOPHAGOSCOPY, GASTROSCOPY, DUODENOSCOPY (EGD), BIOPSY SINGLE OR MULTIPLE;  Surgeon: Aziza Becker MD;  Location:  GI     Bypass graft femoropopliteal Left 11/11/2016     Procedure: BYPASS GRAFT FEMOROPOPLITEAL;  Surgeon: Alli Sood MD;  Location:  OR     Bypass graft artery coronary N/A 11/27/2016     Procedure: BYPASS GRAFT ARTERY CORONARY;  Surgeon: Mihir Hand MD;  Location:  OR     Tracheostomy N/A 12/7/2016     Procedure: TRACHEOSTOMY;  Surgeon: Hima Meade MD;  Location:  OR     Esophagoscopy, gastroscopy, duodenoscopy (egd), combined N/A 12/9/2016     Procedure: COMBINED ESOPHAGOSCOPY, GASTROSCOPY, DUODENOSCOPY (EGD), BIOPSY SINGLE OR MULTIPLE;  Surgeon: Beatrice Lindquist MD;  Location:  GI     Bronchoscopy flexible and rigid N/A 12/9/2016     Procedure: BRONCHOSCOPY FLEXIBLE AND RIGID;  Surgeon: Jer Cook MD;  Location:  GI       Prior to Admission  Medications  Prior to Admission Medications   Prescriptions Last Dose Informant Patient Reported? Taking?   Acetaminophen (TYLENOL PO)  at prn Spouse/Significant Other Yes Yes   Sig: Take 650 mg by mouth daily as needed for mild pain or fever   Atorvastatin Calcium (LIPITOR PO) 1/28/2017 at Unknown time Spouse/Significant Other Yes Yes   Sig: Take 40 mg by mouth At Bedtime   LACTOBACILLUS PO 1/28/2017 at Unknown time Spouse/Significant Other Yes Yes   Sig: Take 1 capsule by mouth 2 times daily   LORAZEPAM PO  at prn Spouse/Significant Other Yes Yes   Sig: Take 0.5 mg by mouth 2 times daily as needed for anxiety   METOPROLOL SUCCINATE ER PO 1/28/2017 at Unknown time Spouse/Significant Other Yes Yes   Sig: Take 50 mg by mouth every evening   Misc. Devices (LUMBAR SUPPORT CUSHION)  Spouse/Significant Other Yes No   Sig: As directed   SERTRALINE HCL PO 1/28/2017 at Unknown time Spouse/Significant Other Yes Yes   Sig: Take 50 mg by mouth daily   TRAZODONE HCL PO 1/28/2017 at Unknown time Spouse/Significant Other Yes Yes   Sig: Take 100 mg by mouth At Bedtime   Tamsulosin HCl (FLOMAX PO) 1/28/2017 at Unknown time Spouse/Significant Other Yes Yes   Sig: Take 0.4 mg by mouth every evening    VORICONAZOLE PO 1/28/2017 at Unknown time Spouse/Significant Other Yes Yes   Sig: Take 250 mg by mouth 2 times daily   WARFARIN SODIUM PO 1/26/2017 Spouse/Significant Other Yes Yes   Sig: Take by mouth daily As directed by INR clinic   White Petrolatum ointment 1/28/2017 at Unknown time Spouse/Significant Other Yes Yes   Sig: Apply topically 2 times daily   albuterol (PROAIR HFA/PROVENTIL HFA/VENTOLIN HFA) 108 (90 BASE) MCG/ACT Inhaler  at prn Spouse/Significant Other No Yes   Sig: Inhale 6 puffs into the lungs every 4 hours as needed for shortness of breath / dyspnea   amiodarone (PACERONE/CODARONE) 200 MG tablet 1/28/2017 at Unknown time Spouse/Significant Other No Yes   Sig: Take 1 tablet (200 mg) by mouth daily   aspirin 81 MG  "chewable tablet 1/28/2017 at Unknown time Spouse/Significant Other No Yes   Sig: Take 1 tablet (81 mg) by mouth daily   ferrous sulfate (IRON) 325 (65 FE) MG tablet Past Week at Unknown time Spouse/Significant Other No Yes   Sig: Take 1 tablet (325 mg) by mouth 2 times daily (with meals)   multivitamin (OCUVITE) TABS 1/28/2017 at Unknown time Spouse/Significant Other Yes Yes   Sig: Take 1 tablet by mouth 2 times daily    multivitamin, therapeutic with minerals (THERA-VIT-M) TABS tablet 1/28/2017 at Unknown time Spouse/Significant Other Yes Yes   Sig: Take 1 tablet by mouth daily      Facility-Administered Medications: None     Allergies  Allergies   Allergen Reactions     Iodine-131 Hives     Patient became red and got \"spots\" on his body after having CT dye.     Nuts [Peanut-Derived]      Neri walnuts     Pletal [Cilostazol]      Found in H and P       Immunization History    There is no immunization history on file for this patient.    Social History  I have reviewed this patient's social history and updated it with pertinent information if needed. Harjit Garcia  reports that he quit smoking about 4 months ago. He has never used smokeless tobacco. He reports that he drinks alcohol. He reports that he does not use illicit drugs.    Family History  I have reviewed this patient's family history and updated it with pertinent information if needed.   No family history on file.    Review of Systems  The 10 point Review of Systems is negative other than noted in the HPI or here.     Physical Exam  Temp: 97.4  F (36.3  C) Temp src: Oral BP: 120/60 mmHg Pulse: 110 Heart Rate: 90 Resp: 30 SpO2: 98 % O2 Device: Oxymask Oxygen Delivery: 8 LPM  Vital Signs with Ranges  Temp:  [97.4  F (36.3  C)-100.8  F (38.2  C)] 97.4  F (36.3  C)  Pulse:  [110] 110  Heart Rate:  [] 90  Resp:  [15-38] 30  BP: (100-145)/(58-87) 120/60 mmHg  SpO2:  [93 %-99 %] 98 %  0 lbs 0 oz    GENERAL APPEARANCE: appears chronically ill   EYES: " Eyes grossly normal to inspection, PERRL and conjunctivae and sclerae normal  HENT: ear canals and TM's normal and nose and mouth without ulcers or lesions  NECK: no adenopathy, no asymmetry, masses, or scars and thyroid normal to palpation  RESP: diminished breath sounds bilateral   CV: regular rates and rhythm, normal S1 S2, no S3 or S4 and no murmur, click or rub  LYMPHATICS: normal ant/post cervical and supraclavicular nodes  ABDOMEN: soft, nontender, without hepatosplenomegaly or masses and bowel sounds normal  MS: extremities normal- no gross deformities noted  SKIN: no suspicious lesions or rashes  NEURO: every time patient falls asleep he wakes up as if afraid     Data  Lab Results   Component Value Date    WBC 9.9 01/29/2017    HGB 8.4* 01/29/2017    HCT 25.1* 01/29/2017    * 01/29/2017     01/29/2017    POTASSIUM 3.6 01/29/2017    CHLORIDE 105 01/29/2017    CO2 26 01/29/2017    BUN 11 01/29/2017    CR 0.67 01/29/2017    * 01/29/2017    NTBNPI 6870* 01/29/2017    TROPONIN <0.07 02/19/2006    TROPI 0.016 01/29/2017    * 01/29/2017    ALT 69 01/29/2017    ALKPHOS 145 01/29/2017    BILITOTAL 0.4 01/29/2017    ISIS 17 12/01/2016    INR 1.91* 01/29/2017       Recent Labs  Lab 01/29/17  0346 01/29/17  0306   CULT No growth after 4 hours No growth after 5 hours     Recent Labs   Lab Test  01/29/17   0346  01/29/17   0306  12/12/16   1203  12/12/16   1130  12/10/16   1347  12/05/16   1341  12/05/16   1030  12/03/16   1020  12/03/16   0215   CULT  No growth after 4 hours  No growth after 5 hours  No growth  No growth  Light growth Escherichia coli  Light growth Klebsiella oxytoca  *  Moderate growth Escherichia coli  Moderate growth Klebsiella oxytoca  *  No growth  Culture negative for acid fast bacilli  Assayed at [facility] [address]    No growth after 4 weeks  Candida glabrata isolated  No additional fungi cultured after 4 weeks incubation  *  No  Legionella species isolated  No Actinomyces species isolated  Moderate growth Normal jazzmine  Heavy growth Escherichia coli  Heavy growth Strain 2 Escherichia coli  *  Canceled, Test credited BRONCH LAVAGE RECEIVED, NO SPUTUM. BRONCH LAVAGE CULTURE   ALREADY ORDERED Charge credited    No growth

## 2017-01-29 NOTE — ED NOTES
"[facility]  ED Nurse Handoff Report    ED Chief complaint: Shortness of Breath      ED Diagnosis:   Final diagnoses:   COPD exacerbation (H)   Anemia, unspecified type   Acute on chronic congestive heart failure, unspecified congestive heart failure type (H)   Hypoxemia       Code Status: Full Code    Allergies:   Allergies   Allergen Reactions     Iodine-131 Hives     Patient became red and got \"spots\" on his body after having CT dye.     Nuts [Peanut-Derived]      Neri walnuts     Pletal [Cilostazol]      Found in H and P       Activity level:  Total Care     Needed?: Yes: Daughter here to interpret: Afghan    Isolation: Yes  Infection: VRE    Bariatric?: No      Vital Signs:   Filed Vitals:    01/29/17 0349 01/29/17 0400 01/29/17 0430 01/29/17 0500   BP: 102/59 106/64 130/58 111/72   Pulse:       Temp:       TempSrc:       Resp: 24 34 38 33   SpO2: 97% 97% 97% 96%       Cardiac Rhythm: ,   Cardiac  Cardiac Rhythm: Sinus tachycardia    Pain level: 0-10 Pain Scale: 0    Is this patient confused?: No    Patient Report: Initial Complaint: From home.  MI and bypass November 24th; complication post-surgery.  Went to TCU and had trach placed.  Trach out x3 weeks ago.  Daughter has been helping him and is currently translating for him.  Tonight became more SOB with RR 30.  83% on 3 L NC at home; EMS applied 10L non rebreather mask.  duoneb given, solumedrol given.  Started bipap.  Initially tachy.  Chest xray complete.    Focused Assessment: A/O x4; needs help repositioning in bed; on bipap.  Tests Performed: blood tests, ua, xray  Abnormal Results: hgb and bnp  Treatments provided: bipap, solumedrol, neb, zosyn, levaquin, needs vanco when levaquin complete    Family Comments: dtr and wife at bedside    OBS brochure/video discussed/provided to patient: N/A    ED Medications:   Medications   lidocaine 1 % 1 mL (not administered)   lidocaine (LMX4) cream (not administered)   sodium chloride (PF) " 0.9% PF flush 3 mL (not administered)   sodium chloride (PF) 0.9% PF flush 3 mL (not administered)   ipratropium - albuterol 0.5 mg/2.5 mg/3 mL (DUONEB) neb solution 3 mL (not administered)   ipratropium - albuterol 0.5 mg/2.5 mg/3 mL (DUONEB) 0.5-2.5 (3) MG/3ML neb solution (3 mLs  Given 1/29/17 0305)   methylPREDNISolone sodium succinate (solu-MEDROL) injection 125 mg (125 mg Intravenous Given 1/29/17 0323)       Drips infusing?:  Yes      ED NURSE PHONE NUMBER: [phone]

## 2017-01-29 NOTE — PROGRESS NOTES
01/29/17 1330   General Information   Onset Date 01/29/17   Start of Care Date 01/29/17   Referring Physician García Teixeira MD   Patient/Family Goals Statement To eat something   Swallowing Evaluation Bedside swallow evaluation   Behaviorial Observations WFL (within functional limits)   Mode of current nutrition NPO   Respiratory Status O2 Supply   Type of O2 supply (oxygen mask 8L)   Comments Pt presents with worsening hypoxia and chills; unclear etiology at this time as patient with chronic obstructive pulmonary disease, chronic scarring noted on chest x-ray which persists and is minimally changed from prior imaging studies, described chills and subjective fevers without objective data of this. At this time, most likely etiologies include viral upper respiratory infection with poor pulmonary reserve, COPD exacerbation, aspiration pneumonia/pneumonitis, healthcare associated pneumonia. 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   Clinical Swallow Evaluation   Oral Musculature generally intact   Structural Abnormalities none present   Dentition upper and lower dentures   Mucosal Quality good   Mandibular Strength and Mobility intact   Oral Labial Strength and Mobility WFL   Lingual Strength and Mobility WFL   Buccal Strength and Mobility intact   Oral Musculature Comments generally intact   Clinical Swallow Eval: Thin Liquid Texture Trial   Mode of Presentation, Thin Liquids spoon;cup  (ice chips too)   Volume of Liquid or Food Presented 1/4 c   Oral Phase of Swallow Poor AP movement;Premature pharyngeal entry   Pharyngeal Phase of Swallow throat clearing;impaired;reduction in laryngeal movement   Diagnostic Statement Intermittend s/sx apsiration with thins by spoon and cup.    Clinical Swallow Eval: Nectar Thick Liquid Texture Trial   Mode of Presentation, Nectar cup   Volume of Nectar Presented 1/4 cup   Oral Phase, [place] Poor AP movement   Pharyngeal Phase,  Nectar reduction in laryngeal movement;impaired   Diagnostic Statement Still mildly discoordinated pharygneal/oral phases, but no overt s/sx of aspiration   Clinical Swallow Eval: Puree Solid Texture Trial   Mode of Presentation, Puree spoon   Volume of Puree Presented 2 TB   Oral Phase, Puree WFL   Pharyngeal Phase, Puree intact;repeated swallows   Diagnostic Statement Pt takes multiple swallows (1-2) per bite   Clinical Swallow Eval: Semisolid Texture Trial   Mode of Presentation, Semisolid fed by clinician   Volume of Semisolid Food Presented 2 bites   Oral Phase, Semisolid WFL   Pharyngeal Phase, Semisolid intact;repeated swallows   Diagnostic Statement Pt takes multiple swallows (1-2) per bite   Esophageal Phase of Swallow   Patient reports or presents with symptoms of esophageal dysphagia Yes  (belching after several of the trials)   General Therapy Interventions   Dysphagia treatment Modified diet education;Instruction of safe swallow strategies;Compensatory strategies for swallowing   Swallow Eval: Clinical Impressions   Skilled Criteria for Therapy Intervention Skilled criteria met.  Treatment indicated.   Functional Assessment Scale (FAS) 4   Treatment Diagnosis mild-mod oropharyngeal dysphagia    Diet texture recommendations Dysphagia diet level 2;Nectar thick liquids   Recommended Feeding/Eating Techniques no straws;small sips/bites;maintain upright posture during/after eating for 30 mins;other (see comments)  (multiple swallows as needed)   Therapy Frequency daily   Predicted Duration of Therapy Intervention (days/wks) 1 wk   Anticipated Discharge Disposition other (see comments)  (pending progress in therapy)   Risks and Benefits of Treatment have been explained. Yes   Patient, family and/or staff in agreement with Plan of Care Yes   Clinical Impression Comments SLP: Hx of COPD, chronic scarring of the lungs, CAD with bypass, aspergilloma; admitted for hypoxia and chills. Pt seen in room, currently on  oxygen mask at 8L, sats at 95% and higher. NPO due to recent use of BiPAP and concern for aspiration. Trials of ice chips x3, thins by spoon, thins by cup, nectars by cup, purees and cracker with puree administered. Hyolaryngeal elevation mildly delayed in onset of swallow trigger and upon palpation it also seemed mildly discoordinated, but variably. Mild throat clearing noted following thins by cup and spoon. Not replicated with nectars, purees, semisolids or ice chips. Pt is mildly impulsive and takes 2-3 sips sequentially by cup, even when instructed to take one sip. For this reason, including acuity of illness and fragile respiratory state with questionable intermittent aspiration, RECS: dysphagia diet 2, nectar thick liquids by spoon only. Upright position in bed or chair for all PO intake, meds crushed or whole with purees. Small and slow bites and sips. Make NPO when on BiPAP or if respiratory status declines, or if coughing with PO intake increases. ST to follow for diet tolerance and education.    Total Evaluation Time   Total Evaluation Time (Minutes) 30

## 2017-01-29 NOTE — PROGRESS NOTES
[facility]    Hospitalist Progress Note    Date of Service (when I saw the patient): 01/29/2017    Assessment and Plan  Harjit Garcia is a 76 year old male with complex medical history who presents with acute worsening of chronic hypoxia, chills.      Acute on chronic hypoxic respiratory failure:   -most likely etiologies include viral upper respiratory infection with poor pulmonary reserve, COPD exacerbation, aspiration pneumonia/pneumonitis, healthcare associated pneumonia.   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  -Appreciate ID and pulmonary consult  -Continue IMC status for now  -Patient now off BiPAP on high flow oxygen however might need BiPAP on a p.r.n. Basis  -Continue Solu-Medrol 125 mg q.8 hours   -Speech pathology consulted -DD2, nectar thick    Coronary artery disease status post coronary artery bypass grafting with residual lesion:   -patient with bypass of LAD late November 2016,   -Though still requires stenting of left main and obtuse marginal branches.  -Has follow-up 1/30/17 with Dr. Schwab for reassessment.    -Scheduled metoprolol 2.5 mg q.6 hours well oral metoprolol held.   -likely will miss 1/30/17 cardiology appointment with Dr. Schwab    Atrial fibrillation, paroxysmal:  -Patient off BiPAP at this time, if no procedures planned by pulmonary, will resume Coumadin tonight and discontinue IV heparin  -Resume prior to admission metoprolol    Peripheral arterial disease: status post stenting in the 1990s, subsequent right lower extremity August 2016, left lower extremity November 2016  -Resume aspirin     Pulmonary aspergillosis: Patient is currently undergoing long-term treatment with antifungal regimen  -Voriconazole per ID       D/W: RN  DVT Prophylaxis: Warfarin  Code Status: Full Code    Disposition: Expected discharge pending clinical course    Clifton Walter MD    Interval History  Dyspnea slightly better. Off  BIPAP on oxymask    -Data reviewed today: I reviewed all new labs and imaging results over the last 24 hours. I personally reviewed no images or EKG's today.    Physical Exam  Temp: 98.5  F (36.9  C) Temp src: Oral BP: 120/60 mmHg Pulse: 110 Heart Rate: 90 Resp: 30 SpO2: 98 % O2 Device: Oxymask Oxygen Delivery: 8 LPM  There were no vitals filed for this visit.  Vital Signs with Ranges  Temp:  [97.4  F (36.3  C)-100.8  F (38.2  C)] 98.5  F (36.9  C)  Pulse:  [110] 110  Heart Rate:  [] 90  Resp:  [15-38] 30  BP: (100-145)/(58-87) 120/60 mmHg  SpO2:  [93 %-99 %] 98 %       Constitutional: AAOX3, NAD, Appears comfortable  HEENT: Moist oral mucosa, no oral lesions, No pallor or icterus  Neck- Supple, Good ROM, No JVD  Respiratory:  Tachypneic, diminished at bases,slightly increased WOB  Cardiovascular: RRR, No murmur  GI: Soft, Non- tender, BS- normoactive, No Guarding/rebound/rigidity  Skin/Integument: Warm and dry, no rashes  MSK: No joint deformity or swelling, no edema  Neuro: CN- grossly intact      Medications    - MEDICATION INSTRUCTIONS -       IV infusion builder WITH additives 100 mL/hr at 01/29/17 1011     - MEDICATION INSTRUCTIONS -       HEParin 600 Units/hr (01/29/17 1002)       sodium chloride (PF)  3 mL Intracatheter Q8H     voriconazole  4 mg/kg Intravenous Q12H     methylPREDNISolone  125 mg Intravenous Q8H     sodium chloride (PF)  3 mL Intracatheter Q8H     piperacillin-tazobactam  4.5 g Intravenous Q6H     [START ON 1/30/2017] levofloxacin  750 mg Intravenous Q24H     sodium chloride (PF)  3 mL Intracatheter Q8H     metoprolol  2.5 mg Intravenous Q6H     [START ON 1/30/2017] vancomycin (VANCOCIN) IV  1,250 mg Intravenous Q24H       Data    Recent Labs  Lab 01/29/17  0306   WBC 9.9   HGB 8.4*   MCV 89   *   INR 1.91*      POTASSIUM 3.6   CHLORIDE 105   CO2 26   BUN 11   CR 0.67   ANIONGAP 9   SHARI 7.5*   *   ALBUMIN 2.2*   PROTTOTAL 6.8   BILITOTAL 0.4   ALKPHOS 145   ALT 69    *   TROPI 0.016       Recent Results (from the past 24 hour(s))   XR Chest Port 1 View    Narrative    CHEST SINGLE VIEW PORTABLE   1/29/2017 3:30 AM     HISTORY: Shortness of breath.    COMPARISON: 1/13/2017.    FINDINGS: Mild hazy opacities in the mid and inferior right lung and  less so in the inferior left lung. Normal-sized cardiac silhouette.  Prior median sternotomy.      Impression    IMPRESSION: Mild hazy opacities in the mid and inferior right lung and  less so in the inferior left lung. While these could be infectious or  inflammatory in etiology, a similar appearance was present on  1/13/2017 and therefore they could represent scarring.    ANISH COX MD

## 2017-01-30 NOTE — PROGRESS NOTES
PULMONARY PROGRESS NOTE          Interval History:      Seen with interpretor. A little better today, cough variable.         Physical Exam:      Blood pressure 117/82, pulse 110, temperature 98.2  F (36.8  C), temperature source Oral, resp. rate 25, weight 51.3 kg (113 lb 1.5 oz), SpO2 98 %.  Filed Vitals:    01/30/17 0400   Weight: 51.3 kg (113 lb 1.5 oz)     Vital Signs with Ranges  Temp:  [97.4  F (36.3  C)-98.4  F (36.9  C)] 98.2  F (36.8  C)  Heart Rate:  [75-89] 80  Resp:  [17-41] 25  BP: ()/(60-82) 117/82 mmHg  SpO2:  [91 %-100 %] 98 %  I/O's Last 24 hours  I/O last 3 completed shifts:  In: 3068.33 [P.O.:920; I.V.:2148.33]  Out: -     Lungs: Coarse, dec   Cardiovascular: rrr   Other: Recent trach stoma with bandage. abd soft.               Medications:          warfarin  0.5 mg Oral ONCE at 18:00     vancomycin  125 mg Oral 4x Daily     sodium chloride (PF)  3 mL Intracatheter Q8H     voriconazole  4 mg/kg Intravenous Q12H     methylPREDNISolone  125 mg Intravenous Q8H     metoprolol (TOPROL-XL) 24 hr tablet 50 mg  50 mg Oral QPM     amiodarone  200 mg Oral Daily     aspirin  81 mg Oral Daily     atorvastatin (LIPITOR) tablet 40 mg  40 mg Oral At Bedtime     ipratropium - albuterol 0.5 mg/2.5 mg/3 mL  3 mL Nebulization 4x daily            Data:      All new lab and imaging data was reviewed.   Recent Labs   Lab Test  01/30/17   0325 01/29/17   0306  01/13/17   1823   12/18/16   0415   WBC  10.0  9.9  9.4   < >   --    HGB  7.0*  8.4*  9.9*   < >   --    MCV  90  89  92   < >   --    PLT  438  563*  441   < >   --    INR  2.21*  1.91*   --    --   1.58*    < > = values in this interval not displayed.      Recent Labs   Lab Test  01/30/17   0325 01/29/17   0306  01/13/17   1823   NA  147*  140  136   POTASSIUM  3.0*  3.6  4.2   CHLORIDE  114*  105  101   CO2  22  26  23   BUN  13  11  28   CR  0.67  0.67  0.96   ANIONGAP  11  9  12   SHARI  7.3*  7.5*  8.8   GLC  174*  114*  90        I reviewed the  patient's new clinical lab test results.     I reviewed the patient's new imaging test results.     I discussed the patient's care with Drs  Saba, Jj, and Deshaun.    Active Problems:    Acute on chronic respiratory failure (H)           Assessment and Plan:      75 yo non-English speaking [place] male with a complicated medical hx is admitted with acute on chronic respiratory failure. CXR on admission showed mild hazy opacities in the mid and inferior right lung and less so in the inferior left lung, similar in appearance to 1/13/17. 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 DDx of acute process includes viral URI, COPD exacerbation, aspiration pneumonia/pneumonitis and HCAP. Patient is currently on antifungal therapy for the aspergilloma. Agree with current therapy, slowly improving.    -agree with ID consult  -suggest repeat noncon CT chest  -wean o2 as tolerated  -no role for bronchoscopy at this time but await CT chest, may need thoracic surgery consultation for consideration of resection of aspergilloma.  -wean steroids as tolerated, suggest 2 more doses of 125mg solumedrol, then transition to 40mg/day of prednisone starting am of Jan 31.    Will follow.    Karlo Davis  [facility]  Office: [phone]  Pager: [phone]

## 2017-01-30 NOTE — PLAN OF CARE
Problem: Goal Outcome Summary  Goal: Goal Outcome Summary  SLP: Swallow tx completed this PM to assess tolerance of diet/appropriateness for upgrade and to educate pt on swallowing precautions.  and niece present. Pt drank 4 oz of nectar-thick liquids and ate dry particulate solid. Intermittent throat clear noted with thickened liquids; no other overt s/sx of aspiration. Mastication of solids slow but adequate. Mild oral residue noted which cleared with use of 2-3 sip liquid wash. Pt remains mildly impulsive taking large bites and sips and eating quickly. He participated in education on swallowing precautions. Recommendations: 1) Advance to dysphagia diet level 3, continue nectar-thick liquids. Recommend continue liquids by spoon 2/2 impulsivity. 2) Swallow precautions include upright position in bed or chair for all PO intake, meds crushed or whole with purees, slow pace, small bites/sips, excellent oral cares. 3) RN to please monitor lungs and make NPO if respiratory status declines or if note s/sx of aspiration with PO. 4) SLP will continue to follow for diet tolerance and education. 5) Discharge recs pending progress.

## 2017-01-30 NOTE — CONSULTS
PULMONARY CONSULTATION      DATE OF SERVICE:  01/29/2017      REQUESTING PHYSICIAN:   None stated.      REASON FOR CONSULTATION:  Respiratory failure.      HISTORY OF PRESENT ILLNESS:  Harjit Garcia is a 76-year-old non-English speaking [place]guill[place] male with a complicated past medical history who was admitted to [facility] earlier today with acute on chronic respiratory failure.  He presented with increased shortness of breath, chills and hypoxia.  He does have a chronic cough which has not changed.  The patient has had multiple hospitalizations, with a prolonged hospitalization in November/December 2016,  during which time he was intubated for 19 days status post CABG on 11/27/2016.  He was discharged to [facility], but then left A on 01/13/2017.  He was extubated and his tracheostomy was decannulated.  He currently has a dressing over his neck.  He currently requires 24 hours per day PCA care and has been weaned down oxygen at 2 liters per nasal cannula.  He recently had his G-tube removed and his tracheostomy removed.  In December, a CT scan of the chest showed a cavitary lesion with a density consistent with aspergilloma and he has been treated with Voriconazole for that reason.  He does not speak any English and is not able to provide any additional history.  The patient's niece is at the bedside.  She speaks limited English and has no knowledge of his medical history.      CURRENT MEDICATIONS:  Amiodarone, aspirin, Lipitor, Levaquin, Solu-Medrol, Toprol, Zosyn, vancomycin, and Voriconazole.      PAST MEDICAL HISTORY:   1.  Recent hospitalization for prolonged respiratory failure.  The patient has been followed in our clinic by my partner, Dr. Aguirre.   2.  Coronary artery disease, status post CABG 11/2016.   3.  Paroxysmal atrial fibrillation.   4.  Peripheral vascular disease.   5.  Pulmonary aspergilloma.      ALLERGIES:  Iodine, peanuts and Pletal.      SOCIAL HISTORY:  The patient is a former half  pack per day smoker and quit smoking last fall.      REVIEW OF SYSTEMS:  Noncontributory.      PHYSICAL EXAMINATION:   GENERAL:  Reveals an older chronically ill-appearing gentleman in no acute distress.   VITAL SIGNS:  He is afebrile.  Respiratory rate is 20-30.  Pulse is 80, blood pressure is 123/68 and oxygen saturation is currently 100% on 10 liter per minute Oxymizer.   HEENT:  Grossly unremarkable.   NECK:  Healing tracheostomy site.   LUNGS:  Revealed decreased breath sounds, slightly coarse bilaterally but no definite wheezes.  He does have a few crackles at the bases.   CARDIOVASCULAR:  Revealed regular rate and rhythm.   ABDOMEN:  Soft and nontender.  He has his previous G-tube site noted.   EXTREMITIES:  Showed no cyanosis, clubbing or edema.      LABORATORY DATA:  Electrolytes and renal function are normal.  Blood gas pH 7.50, pCO2 of 30, pO2 44 (VBG), white count 9.9, hemoglobin 8.4, hematocrit 25.1, platelet count 563,000.  Chest x-ray on admission shows mild hazy opacities in the mid and inferior right lung and less so in the inferior left lung, similar in appearance to 01/13/2017.  Most recent CT scan of the chest 12/06/2016 showed mild patchy opacities in the mid and inferior lungs bilaterally, new since  11/26/2016, and a 3.1 cm air cavity in the posterior aspect of the right upper lobe containing a 2.4 cm internal rounded opacity consistent with aspergilloma.      ASSESSMENT:  A 76-year-old non-English speaking Scottish male with a complicated past medical history who is admitted with acute on chronic respiratory failure.  Differential diagnosis includes viral upper respiratory infection, chronic obstructive pulmonary disease exacerbation, aspiration pneumonia/pneumonitis and healthcare-associated pneumonia.  He is currently on antifungal therapy for the aspergilloma.  I agree with his current empiric therapy.  If the patient is not improving, would then recommend a repeat CT scan of the chest.   Oxygenation is currently stable on an Oxymizer cannula.      PLAN:   1.  Wean down oxygen as able, keep SaO2 greater than 90%.   2.  Bronchodilators, start Duonebs.   3.  Check sputum culture and sensitivity.   4.  Antibiotics with Levaquin, Zosyn, vancomycin and Voriconazole.   5.  Steroids, Solu-Medrol as ordered.   6.  Follow chest x-ray.      I appreciate the opportunity to participate in his care.         ZHENG RYAN MD             D: 2017 18:50   T: 2017 19:38   MT: ALAN      Name:     JENIFER MCDONOUGH   MRN:      [id number]        Account:       [id number]   :      1940           Consult Date:  2017      Document: [id number]

## 2017-01-30 NOTE — PROGRESS NOTES
When pt up to bedside commode he became short of breath oximizer on 4 liters needed to be increased to 6 when up on commode now back to 4 liters after back to bed and at rest. Did note hands shaking earlier during assessment at 0900 reported to me and  That pt is positive for c-diff

## 2017-01-30 NOTE — PROGRESS NOTES
[facility]    Infectious Disease Progress Note    Date of Service (when I saw the patient): 01/30/2017     Assessment and Plan  Harjit Garcia is a 76 year old male who was admitted on 1/29/2017.     Impression:    76 y.o male with chronic pulmonary nodules with pos cultures from an year ago and positive Galactomannan from last admission, currently on Voriconazole. Aspergillus did not grow in the cultures.    Also has baseline COPD was a smoker till the fall of 2016, more than 50 years of smoking.    In December was admitted with ACS but the stay was complicated with pneumonia acute bacterial ( Klebsiella and E coli on chronic inflammatory process of the lungs)    Was discharged to [facility] from where signed out AMA by family.    Admitted this admission with worsening respiratory status ( Hypoxic),  Some fevers, chills.    On IV vanco, zosyn, levaquin and voriconazole. Imaging unchanged from before.    What is being described as rigors per family appears to be jerky movement with sleep.   Now positive for C diff.     Recommendations:    Consider alternative diagnosis such as chronic progressive respiratory inflammation of non infectious etiology. He is also on Amiodarone, Could that be responsible, consider stopping/   Needs sputum cultures. Pending.   Repeat CT scan.   Consider bronching because has been on the broad spectrum antibiotics through out stay at [facility] ( more than 6 weeks of total broad spectrum antibiotics and, more than 8 weeks of voriconazole now) and during the stay at the [facility] and has not much improved his respiratory status. I am not sure what  more antibiotics will do already has been on multiple antibiotics for a long time, needs tissue to help with diagnosis. His procal is negative and now has C diff. Stoping antibiotics.   Voriconazole level is pending. Already on voriconzaole for 6 plus weeks. And with negative cultures and imaging which appears more  like fungal ball doubt aaron is helping much, spoke with Dr. Meade who will also take a look. Will stop voriconazole.   ? Neurology consult for abnormal movements in sleep.       Ted Gardner MD    Interval History  Afebrile   On 4 L   Now has C diff     Physical Exam  Temp: 98.2  F (36.8  C) Temp src: Oral BP: 117/82 mmHg   Heart Rate: 80 Resp: 25 SpO2: 98 % O2 Device: Oxymizer cannula Oxygen Delivery: 4 LPM  Filed Vitals:    01/30/17 0400   Weight: 51.3 kg (113 lb 1.5 oz)     Vital Signs with Ranges  Temp:  [97.4  F (36.3  C)-98.4  F (36.9  C)] 98.2  F (36.8  C)  Heart Rate:  [75-89] 80  Resp:  [17-41] 25  BP: ()/(60-82) 117/82 mmHg  SpO2:  [91 %-100 %] 98 %    Constitutional: Awake, alert, cooperative, no apparent distress  Lungs: diminished breath sounds   Cardiovascular: Regular rate and rhythm, normal S1 and S2, and no murmur noted  Abdomen: Normal bowel sounds, soft, non-distended, non-tender  Skin: No rashes, no cyanosis, no edema  Other:    Medications    dextrose 5% and 0.9% NaCl       - MEDICATION INSTRUCTIONS -       - MEDICATION INSTRUCTIONS -       Warfarin Therapy Reminder         warfarin  0.5 mg Oral ONCE at 18:00     vancomycin  125 mg Oral 4x Daily     sodium chloride (PF)  3 mL Intracatheter Q8H     voriconazole  4 mg/kg Intravenous Q12H     methylPREDNISolone  125 mg Intravenous Q8H     piperacillin-tazobactam  4.5 g Intravenous Q6H     levofloxacin  750 mg Intravenous Q24H     vancomycin (VANCOCIN) IV  1,250 mg Intravenous Q24H     metoprolol (TOPROL-XL) 24 hr tablet 50 mg  50 mg Oral QPM     amiodarone  200 mg Oral Daily     aspirin  81 mg Oral Daily     atorvastatin (LIPITOR) tablet 40 mg  40 mg Oral At Bedtime     ipratropium - albuterol 0.5 mg/2.5 mg/3 mL  3 mL Nebulization 4x daily       Data  All microbiology laboratory data reviewed.  Recent Labs   Lab Test  01/30/17   0325  01/29/17   0306  01/13/17   1823   WBC  10.0  9.9  9.4   HGB  7.0*  8.4*  9.9*   HCT  21.2*  25.1*  29.1*    MCV  90  89  92   PLT  438  563*  441     Recent Labs   Lab Test  01/30/17   0325  01/29/17   0306  01/13/17   1823   CR  0.67  0.67  0.96     No lab results found.  Recent Labs   Lab Test  01/29/17   0346  01/29/17   0306  12/12/16   1203  12/12/16   1130  12/10/16   1347  12/05/16   1341  12/05/16   1030  12/03/16   1020  12/03/16   0215   CULT  No growth after 1 day  No growth after 1 day  No growth  No growth  Light growth Escherichia coli  Light growth Klebsiella oxytoca  *  Moderate growth Escherichia coli  Moderate growth Klebsiella oxytoca  *  No growth  Culture negative for acid fast bacilli  Assayed at "[facility] [address]    No growth after 4 weeks  Candida glabrata isolated  No additional fungi cultured after 4 weeks incubation  *  No Legionella species isolated  No Actinomyces species isolated  Moderate growth Normal jazzmine  Heavy growth Escherichia coli  Heavy growth Strain 2 Escherichia coli  *  Canceled, Test credited BRONCH LAVAGE RECEIVED, NO SPUTUM. BRONCH LAVAGE CULTURE   ALREADY ORDERED Charge credited    No growth

## 2017-01-30 NOTE — PROGRESS NOTES
Unit of PRBC started per order pt signed consent earlier with interpretor and . And daughter per phone aware of transfusion ordered see frequent vitals flowsheet

## 2017-01-30 NOTE — PROGRESS NOTES
[facility]    Hospitalist Progress Note    Date of Service (when I saw the patient): 01/30/2017    Assessment and Plan  Harjit Garcia is a 76 year old male with complex medical history who presents with acute worsening of chronic hypoxia, chills.      Acute on chronic hypoxic respiratory failure:   -most likely etiologies include viral upper respiratory infection with poor pulmonary reserve, COPD exacerbation, aspiration pneumonia/pneumonitis, healthcare associated pneumonia.   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  -Appreciate ID and pulmonary consult  -Patient now off BiPAP on 4-5 L via NC  -Continue Solu-Medrol 125 mg q.8 hours, wean per pulmonary  -Speech pathology consulted -DD2, nectar thick diet  -Pt has received multiple rounds of antibiotics, still has ongoing pulmonary symptoms, and now C. Difficile  -Per ID will discontinue antibiotics today  -Repeat CT chest  -Dr Meade to see  -No need for bronchoscopy yet per pulmonary    Coronary artery disease status post coronary artery bypass grafting with residual lesion:   -patient with bypass of LAD late November 2016,   -still requires stenting of left main and obtuse marginal branches.  -Had follow-up 1/30/17 with Dr. Schwab for reassessment.  Discussed with  and care coordinator to inform Dr. Schwab's office about patient's admission.  We'll likely need to reschedule appointment due to pulmonary issues and now C. difficile  -Continue metoprolol extended release 50 mg daily, aspirin 81 mg daily and Lipitor 40 mg daily   -Cardiology consult if new issues come up regarding his coronary artery disease    Atrial fibrillation, paroxysmal:  -Continue metoprolol  -Currently on normal sinus rhythm  -Anticoagulated with warfarin.  INR therapeutic at 2.2    Peripheral arterial disease: status post stenting in the 1990s, subsequent right lower extremity August 2016, left lower  extremity November 2016  -Continue aspirin     Pulmonary aspergillosis: Patient is currently undergoing long-term treatment with antifungal regimen  -Voriconazole per ID   -Patient to get repeat CT chest and evaluation by thoracic surgery    Clostridium difficile diarrhea:  -Start vancomycin 125 mg p.o. q.6 hours  -ID following    Acute on chronic anemia:  -baseline appears to be around 7-9  -Pt anticoagulated, but no hx of bleeding from any source  -Transfuse 1 unit of PRBC today.      D/W: RN  DVT Prophylaxis: Warfarin  Code Status: Full Code    Disposition: Expected discharge pending clinical course    Clifton Walter MD    Interval History   History obtained with the help of Niuean speaking interpretor    Dyspnea better. Off BIPAP on oxymask.  [place] dropped.  Denies any hematochezia or melena or hemoptysis    -Data reviewed today: I reviewed all new labs and imaging results over the last 24 hours. I personally reviewed no images or EKG's today.    Physical Exam  Temp: 97.4  F (36.3  C) Temp src: Oral BP: 117/82 mmHg   Heart Rate: 80 Resp: 25 SpO2: 98 % O2 Device: Oxymizer cannula Oxygen Delivery: 4 LPM  Filed Vitals:    01/30/17 0400   Weight: 51.3 kg (113 lb 1.5 oz)     Vital Signs with Ranges  Temp:  [97.4  F (36.3  C)-98.5  F (36.9  C)] 97.4  F (36.3  C)  Heart Rate:  [75-89] 80  Resp:  [17-41] 25  BP: ()/(60-82) 117/82 mmHg  SpO2:  [91 %-100 %] 98 %  I/O last 3 completed shifts:  In: 3068.33 [P.O.:920; I.V.:2148.33]  Out: -     Constitutional: AAOX3, NAD,  appears frail  HEENT: Moist oral mucosa, no oral lesions, No pallor or icterus  Neck- Supple, Good ROM, No JVD  Respiratory:  Tachypneic, diminished at bases,slightly increased WOB  Cardiovascular: RRR, No murmur  GI: Soft, Non- tender, BS- normoactive, No Guarding/rebound/rigidity  Skin/Integument: Warm and dry, no rashes  MSK: No joint deformity or swelling, no edema  Neuro: CN- grossly intact      Medications    dextrose 5% and 0.9% NaCl        - MEDICATION INSTRUCTIONS -       - MEDICATION INSTRUCTIONS -       Warfarin Therapy Reminder         warfarin  0.5 mg Oral ONCE at 18:00     sodium chloride (PF)  3 mL Intracatheter Q8H     voriconazole  4 mg/kg Intravenous Q12H     methylPREDNISolone  125 mg Intravenous Q8H     piperacillin-tazobactam  4.5 g Intravenous Q6H     levofloxacin  750 mg Intravenous Q24H     vancomycin (VANCOCIN) IV  1,250 mg Intravenous Q24H     metoprolol (TOPROL-XL) 24 hr tablet 50 mg  50 mg Oral QPM     amiodarone  200 mg Oral Daily     aspirin  81 mg Oral Daily     atorvastatin (LIPITOR) tablet 40 mg  40 mg Oral At Bedtime     ipratropium - albuterol 0.5 mg/2.5 mg/3 mL  3 mL Nebulization 4x daily       Data    Recent Labs  Lab 01/30/17  0325 01/29/17  0306   WBC 10.0 9.9   HGB 7.0* 8.4*   MCV 90 89    563*   INR 2.21* 1.91*   * 140   POTASSIUM 3.0* 3.6   CHLORIDE 114* 105   CO2 22 26   BUN 13 11   CR 0.67 0.67   ANIONGAP 11 9   SHARI 7.3* 7.5*   * 114*   ALBUMIN  --  2.2*   PROTTOTAL  --  6.8   BILITOTAL  --  0.4   ALKPHOS  --  145   ALT  --  69   AST  --  111*   TROPI  --  0.016       No results found for this or any previous visit (from the past 24 hour(s)).

## 2017-01-30 NOTE — PLAN OF CARE
Problem: Goal Outcome Summary  Goal: Goal Outcome Summary  Outcome: Improving  VSS, LS course, congested and productive cough, sputum sample obtained, O2 down to 3-4L oxymizer @ % which is baseline for patient, multiple stools overnight so Cdif sample obtained, doing well w/ nectar thick liquids,  scheduled for 0800 today, up strong Ax2-3 but moves well in bed

## 2017-01-30 NOTE — PROGRESS NOTES
PULMONARY CONSULT NOTE    Full consult dictated.    Active Problems:    Acute on chronic respiratory failure (H)           Assessment and Plan:      77 yo non-English speaking Finnish male with a complicated medical hx is admitted with acute on chronic respiratory failure. CXR on admission showed mild hazy opacities in the mid and inferior right lung and less so in the inferior left lung, similar in appearance to 1/13/17. 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 DDx of acute process includes viral URI, COPD exacerbation, aspiration pneumonia/pneumonitis and HCAP. Patient is currently on antifungal therapy for the aspergilloma. Agree with current therapy. If not improving, would repeat CT Chest. Respiratory status currently stable on Oxymizer.    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 Adjust oxygen, keep SaO2 > 90%  2. Bronchodilators - start DuoNebs.  3. Check Sputum C&S  4. Antibiotics - Levaquin, Zosyn, Vanco, Voriconazole.  5. Steroids - Solumedrol  6. Follow CXR.    Updated niece at bedside (she speaks some English).    Dr. Davis will be following the patient starting tomorrow. Thanks,      Abdullahi Mercado MD    [facility]  [phone] (pager)  [phone] (office)

## 2017-01-31 NOTE — PROGRESS NOTES
[facility]    Hospitalist Progress Note    Date of Service (when I saw the patient): 01/31/2017    Assessment and Plan  Harjit Garcia is a 76 year old male with complex medical history who presents with acute worsening of chronic hypoxia, chills.      Acute on chronic hypoxic respiratory failure:   -most likely etiologies include viral upper respiratory infection with poor pulmonary reserve, COPD exacerbation, aspiration pneumonia/pneumonitis, healthcare associated pneumonia.   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  -Appreciate ID and pulmonary consult  -BiPAP as needed. Mostly at night. Requiring more O2. Pleural effusions seen on CT chest. Will give a dose of 40 mg iv lasix now.   -Solu-Medrol 125 mg q.8 hours switched to prednisone 40 mg daily.   -Speech pathology consulted -DD2, nectar thick diet  -Pt has received multiple rounds of antibiotics, still has ongoing pulmonary symptoms, and now C. Difficile  -Per ID, now just merrem. Was on iv vanco, zosyn, levaquin, voriconazole.  -Repeat CT chest on 1/31/17 shows:  1. Cavitation and possible aspergilloma again noted in the right upper  lobe posteriorly which has not changed appreciably since 12/6/2016.  2. Bronchiectasis and consolidation in both lower lobes.  3. New groundglass infiltration in the left upper lobe and to a lesser  extent the right middle lobe which may be secondary to pneumonitis.  4. Small bilateral pleural effusions which have increased in size.  5. Remainder of the scan is unchanged since 12/6/2016.  -Dr Meade consult request.   -No need for bronchoscopy yet per pulmonary    Coronary artery disease status post coronary artery bypass grafting with residual lesion:   -patient with bypass of LAD late November 2016,   -still requires stenting of left main and obtuse marginal branches.  -Had follow-up 1/30/17 with Dr. Schwab for reassessment.  Discussed with   and care coordinator to inform Dr. Schwab's office about patient's admission.  Reschedule appointment due to pulmonary issues and now C. difficile  -Continue metoprolol extended release 50 mg daily, aspirin 81 mg daily and Lipitor 40 mg daily   -Cardiology consult if new issues come up regarding his coronary artery disease    Atrial fibrillation, paroxysmal:  -Continue metoprolol  -Currently on normal sinus rhythm  -Anticoagulated with warfarin. Pharmacy to dose    Peripheral arterial disease: status post stenting in the 1990s, subsequent right lower extremity August 2016, left lower extremity November 2016  -Continues aspirin, atorvastatin, warfarin.     Pulmonary aspergillosis: Patient is currently undergoing long-term treatment with antifungal regimen  -Voriconazole stopped per ID as he had a long course and doesn't appear to be helping.      Clostridium difficile diarrhea:  -vancomycin 125 mg p.o. q.6 hours  -ID following    Acute on chronic anemia:  -baseline appears to be around 7-9  -Pt anticoagulated, but no hx of bleeding from any source  -Transfused 1 unit of PRBC on 1/30/17.    Involuntary movements of extremities.   Daughter reports he has been flailing his upper and lower extremities. Only occurs when he is in a deep sleep. Daughter woke him up and he reports he was having a bad dream. She asked if we could give him medications for anxiety. Will monitor for now as we may cause delirium. Not quite sure ? Myoclonic jerks, ?RLS? Anxiety? Night terrors    DVT Prophylaxis: Warfarin  Code Status: Full Code    Disposition: Expected discharge pending clinical course    Gumaro Mora MD    Interval History   -Data reviewed today: I reviewed all new labs and imaging results over the last 24 hours. I personally reviewed no images or EKG's today.    Physical Exam  Temp: 98  F (36.7  C) Temp src: Axillary BP: 114/89 mmHg   Heart Rate: 63 Resp: (!) 39 SpO2: 93 % O2 Device: Oxymizer cannula Oxygen Delivery: 15  [facility]  Filed Vitals:    01/30/17 0400 01/31/17 0600   Weight: 51.3 kg (113 lb 1.5 oz) 53.6 kg (118 lb 2.7 oz)     Vital Signs with Ranges  Temp:  [97.7  F (36.5  C)-98.1  F (36.7  C)] 98  F (36.7  C)  Heart Rate:  [63-89] 63  Resp:  [20-39] 39  BP: (114-155)/() 114/89 mmHg  FiO2 (%):  [45 %-60 %] 45 %  SpO2:  [85 %-99 %] 93 %  I/O last 3 completed shifts:  In: 1080 [P.O.:780]  Out: 150 [Urine:150]    Constitutional: AAOX3, NAD,  appears frail  HEENT: Moist oral mucosa, no oral lesions, No pallor or icterus  Neck- Supple, Good ROM, No JVD  Respiratory:  Tachypneic, diminished at bases,slightly increased WOB  Cardiovascular: RRR, No murmur  GI: Soft, Non- tender, BS- normoactive, No Guarding/rebound/rigidity  Skin/Integument: Warm and dry, no rashes  MSK: No joint deformity or swelling, no edema  Neuro: CN- grossly intact      Medications    - MEDICATION INSTRUCTIONS -       - MEDICATION INSTRUCTIONS -       Warfarin Therapy Reminder         warfarin  1 mg Oral ONCE at 18:00     meropenem  500 mg Intravenous Q6H     vancomycin  125 mg Oral 4x Daily     methylPREDNISolone  125 mg Intravenous Q8H     metoprolol (TOPROL-XL) 24 hr tablet 50 mg  50 mg Oral QPM     amiodarone  200 mg Oral Daily     aspirin  81 mg Oral Daily     atorvastatin (LIPITOR) tablet 40 mg  40 mg Oral At Bedtime     ipratropium - albuterol 0.5 mg/2.5 mg/3 mL  3 mL Nebulization 4x daily       Data    Recent Labs  Lab 01/31/17  0746 01/30/17  2150 01/30/17  0325 01/29/17  0306   WBC 15.4*  --  10.0 9.9   HGB 8.2*  --  7.0* 8.4*   MCV 89  --  90 89     --  438 563*   INR 2.21*  --  2.21* 1.91*   *  --  147* 140   POTASSIUM 3.6 3.5 3.0* 3.6   CHLORIDE 115*  --  114* 105   CO2 24  --  22 26   BUN 15  --  13 11   CR 0.64*  --  0.67 0.67   ANIONGAP 8  --  11 9   SHARI 7.7*  --  7.3* 7.5*   *  --  174* 114*   ALBUMIN  --   --   --  2.2*   PROTTOTAL  --   --   --  6.8   BILITOTAL  --   --   --  0.4   ALKPHOS  --   --   --  145   ALT  --    --   --  69   AST  --   --   --  111*   TROPI  --   --   --  0.016       Recent Results (from the past 24 hour(s))   CT Chest w/o Contrast    Narrative    CT CHEST WITHOUT CONTRAST 1/31/2017 1:41 PM    HISTORY: Cough. History of aspergillosis.    TECHNIQUE: Scans obtained from the apices through the diaphragm  without IV contrast. Radiation dose for this scan was reduced using  automated exposure control, adjustment of the mA and/or kV according  to patient size, or iterative reconstruction technique.    COMPARISON: 12/6/2016.    FINDINGS: Multiple mildly prominent lymph nodes in the mediastinum  again noted and unchanged from 12/6/2016. Calcification of the  thoracic aorta and coronary arteries. Previous sternotomy. Small  bilateral pleural effusions which have increased slightly in size  since 12/6/2016. Bronchiectasis and consolidation again noted in the  right upper lobe posteriorly. There is a cavity with filling defect  identified in this region which could represent an aspergilloma.  Bronchiectasis and consolidation in both lower lobes again noted and  essentially unchanged. Groundglass infiltration in the left upper lobe  and to a lesser extent the right middle lobe which has progressed  since 12/6/2016 and is consistent with pneumonitis. Emphysematous  changes again noted in both lungs. Remainder of the scan is negative  and unchanged.      Impression    IMPRESSION:   1. Cavitation and possible aspergilloma again noted in the right upper  lobe posteriorly which has not changed appreciably since 12/6/2016.  2. Bronchiectasis and consolidation in both lower lobes.  3. New groundglass infiltration in the left upper lobe and to a lesser  extent the right middle lobe which may be secondary to pneumonitis.  4. Small bilateral pleural effusions which have increased in size.  5. Remainder of the scan is unchanged since 12/6/2016.    OZZIE NGUYEN MD

## 2017-01-31 NOTE — PLAN OF CARE
Problem: Goal Outcome Summary  Goal: Goal Outcome Summary  Outcome: No Change  VSS, pt had RR in upper 30's and use of accessory muscles most of kasandra so decided to place on bipap overnight, RR dropped to mid 20's and breathing seems much more relaxed, otherwise doing well, voiding, up to commode w/ strong Ax2

## 2017-01-31 NOTE — PROGRESS NOTES
[facility]    Infectious Disease Progress Note    Date of Service (when I saw the patient): 01/31/2017     Assessment and Plan  Harjit Garcia is a 76 year old male who was admitted on 1/29/2017.     Impression:    76 y.o male with chronic pulmonary nodules with pos cultures from an year ago and positive Galactomannan from last admission, currently on Voriconazole. Aspergillus did not grow in the cultures.    Also has baseline COPD was a smoker till the fall of 2016, more than 50 years of smoking.    In Novemeber was admitted with ACS but the stay was complicated with pneumonia acute bacterial ( Klebsiella and E coli on chronic inflammatory process of the lungs)    Was discharged to [facility] from where signed out AMA by family.    Admitted this admission with worsening respiratory status ( Hypoxic),  Some fevers, chills.    What is being described as rigors per family appears to be jerky movement with sleep.   Positive for C diff.     Recommendations:    Consider alternative diagnosis such as chronic progressive respiratory inflammation of non infectious etiology. He is also on Amiodarone, Could that be responsible, consider stopping.   Sputum positive for GNR again, starting meropenem. ? Aspiration, superimposed infection on poor lungs.   Repeat CT scan.   Spoke with pulmonary they do not think bronchoscopy is required.   Already on voriconzaole for 6 plus weeks. And with negative cultures and imaging which appears more like fungal ball doubt vori is helping much, spoke with Dr. Meade who will also take a look. Will stop voriconazole.   ? Neurology consult for abnormal movements in sleep.       Ted Gardner MD    Interval History  Afebrile   Sputum cultures positive for GNR pending ID   More SOB     Physical Exam  Temp: 97.7  F (36.5  C) Temp src: Axillary BP: 114/89 mmHg Pulse: 82 Heart Rate: 63 Resp: (!) 39 SpO2: 93 % O2 Device: Oxymizer cannula Oxygen Delivery: 15 LPM  Filed  Vitals:    01/30/17 0400 01/31/17 0600   Weight: 51.3 kg (113 lb 1.5 oz) 53.6 kg (118 lb 2.7 oz)     Vital Signs with Ranges  Temp:  [97.4  F (36.3  C)-98.2  F (36.8  C)] 97.7  F (36.5  C)  Pulse:  [82] 82  Heart Rate:  [63-89] 63  Resp:  [20-39] 39  BP: ()/() 114/89 mmHg  FiO2 (%):  [45 %-60 %] 45 %  SpO2:  [85 %-99 %] 93 %    Constitutional: Awake, alert, more short of breath   Lungs: diminished breath sounds   Cardiovascular: Regular rate and rhythm, normal S1 and S2, and no murmur noted  Abdomen: Normal bowel sounds, soft, non-distended, non-tender  Skin: No rashes, no cyanosis, no edema  Other:    Medications    - MEDICATION INSTRUCTIONS -       - MEDICATION INSTRUCTIONS -       Warfarin Therapy Reminder         warfarin  1 mg Oral ONCE at 18:00     meropenem  500 mg Intravenous Q8H     vancomycin  125 mg Oral 4x Daily     methylPREDNISolone  125 mg Intravenous Q8H     metoprolol (TOPROL-XL) 24 hr tablet 50 mg  50 mg Oral QPM     amiodarone  200 mg Oral Daily     aspirin  81 mg Oral Daily     atorvastatin (LIPITOR) tablet 40 mg  40 mg Oral At Bedtime     ipratropium - albuterol 0.5 mg/2.5 mg/3 mL  3 mL Nebulization 4x daily       Data  All microbiology laboratory data reviewed.  Recent Labs   Lab Test  01/31/17   0746  01/30/17   0325  01/29/17   0306   WBC  15.4*  10.0  9.9   HGB  8.2*  7.0*  8.4*   HCT  25.0*  21.2*  25.1*   MCV  89  90  89   PLT  449  438  563*     Recent Labs   Lab Test  01/31/17   0746  01/30/17   0325  01/29/17   0306   CR  0.64*  0.67  0.67     No lab results found.  Recent Labs   Lab Test  01/30/17   0500  01/29/17   0346  01/29/17   0306  12/12/16   1203  12/12/16   1130  12/10/16   1347  12/05/16   1341  12/05/16   1030  12/03/16   1020   CULT  Light growth Normal jazzmine  Light growth Lactose fermenting gram negative rods  *  No growth after 2 days  No growth after 2 days  No growth  No growth  Light growth Escherichia coli  Light growth Klebsiella oxytoca  *  Moderate  growth Escherichia coli  Moderate growth Klebsiella oxytoca  *  No growth  Culture negative for acid fast bacilli  Assayed at [facility] [address]    No growth after 4 weeks  Candida glabrata isolated  No additional fungi cultured after 4 weeks incubation  *  No Legionella species isolated  No Actinomyces species isolated  Moderate growth Normal jazzmine  Heavy growth Escherichia coli  Heavy growth Strain 2 Escherichia coli  *  Canceled, Test credited BRONCH LAVAGE RECEIVED, NO SPUTUM. BRONCH LAVAGE CULTURE   ALREADY ORDERED Charge credited

## 2017-01-31 NOTE — PROGRESS NOTES
Patient RR 35-40, Sats 90-94%, 4L oximyzer, using accessory muscles, appears anxious, and uncomfortable, desating to high 70's with minimal activity.  RT notified, here at bedside to assess patient. ABG's ordered. All other VSS. Lungs course crackles throughout. Will continue to monitor.

## 2017-01-31 NOTE — PLAN OF CARE
Problem: Goal Outcome Summary  Goal: Goal Outcome Summary  SLP: Attempted swallow tx this AM. Per RN, pt recently came off BiPAP and not appropriate for PO at this time. SLP will re-attempt this PM as schedule allows.

## 2017-01-31 NOTE — PROGRESS NOTES
Vascular Surgery Progress Note    S:I visited with Harjit Garcia today.  His wife was present and I spoke with his daughter on the phone  He has had multiple complications following his coronary bypass surgery.  He required tracheostomy which has now been removed  He still has a gastrostomy tube. He has high oxygen dependency is on CPAP at night and oxygen during the day.    He is well known to me following his bilateral femoral to below-knee popliteal in situ bypass grafts which have been functioning well. He is due for follow-up duplex on both of the grafts ( not on scheduled due to the cardiac intervention)    The patient via his daughter as an  reports that when he was home prior to the most recent hospitalization that his right leg with occasionally give out on him.  We also feel that he is very anxious and not sleeping well.       O:   Vitals:  BP  Min: 114/89  Max: 154/78  Temp  Av.8  F (36.6  C)  Min: 97.4  F (36.3  C)  Max: 98.1  F (36.7  C)  I/O last 3 completed shifts:  In: 540 [P.O.:240]  Out: -     Physical Exam: Alert and appropriate.  On oxygen.  Vital signs stable.                        +3 easily palpable pulses in both bypass grafts.  No evidence of any knee pathology.                        Able to easily lift both legs up off the bed--weakness is appreciated though expected with                          Decompensation from prolonged recovery from his heart surgery.                       No lower extremity edema.  Normal sensation.  No ulcerations on his feet.      Assessment/Plan: Very slow recovery from cardiac surgery.  May also be recovering from pneumonia and still requiring oxygenation.                                   Both bypass grafts are clinically patent with good strong pulses.  Since he is not hospital for several days we'll perform bilateral follow-up duplex ultrasounds.                                  Hospitalist will dress the anxiety issues.      Wm. Sood  MD

## 2017-02-01 NOTE — PLAN OF CARE
Problem: Goal Outcome Summary  Goal: Goal Outcome Summary  SLP: Attempted swallow tx this AM. Spoke with RN. Pt on BiPAP and not appropriate today. Will cancel and re-attempt tomorrow. Recommend NPO until stable off BiPAP.

## 2017-02-01 NOTE — PROGRESS NOTES
[facility]    Hospitalist Progress Note    Date of Service (when I saw the patient): 02/01/2017    Assessment and Plan  Harjit Garcia is a 76 year old male with complex medical history who presents with acute worsening of chronic hypoxia, chills.      Acute on chronic hypoxic respiratory failure:   -most likely etiologies include viral upper respiratory infection with poor pulmonary reserve, COPD exacerbation, aspiration pneumonia/pneumonitis, healthcare associated pneumonia.   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  -Appreciate ID and Pulmonary consults  -BiPAP as needed. Mostly at night. Requiring more O2. Pleural effusions seen on CT chest. Will give 40 mg iv lasix daily and reassess.    -Solu-Medrol switched to prednisone 40 mg daily.   -Speech pathology consulted -DD2, nectar thick diet  -Pt has received multiple rounds of antibiotics, still has ongoing pulmonary symptoms, and now C. Difficile  -Per ID, now just merrem. Was on iv vanco, zosyn, levaquin, voriconazole.  -Repeat CT chest on 1/31/17 shows:  1. Cavitation and possible aspergilloma again noted in the right upper  lobe posteriorly which has not changed appreciably since 12/6/2016.  2. Bronchiectasis and consolidation in both lower lobes.  3. New groundglass infiltration in the left upper lobe and to a lesser  extent the right middle lobe which may be secondary to pneumonitis.  4. Small bilateral pleural effusions which have increased in size.  5. Remainder of the scan is unchanged since 12/6/2016.  -Dr Meade consult requested.     Coronary artery disease status post coronary artery bypass grafting with residual lesion:   -patient with bypass of LAD late November 2016,   -still requires stenting of left main and obtuse marginal branches.  -Had follow-up 1/30/17 with Dr. Schwab for reassessment.  Discussed with  and care coordinator to inform Dr. Schwab's  office about patient's admission.  Reschedule appointment due to pulmonary issues and now C. difficile  -Continue metoprolol extended release 50 mg daily, aspirin 81 mg daily and Lipitor 40 mg daily   -Cardiology consult if new issues come up regarding his coronary artery disease    Atrial fibrillation, paroxysmal:  -Continue metoprolol  -Currently on normal sinus rhythm  -Anticoagulated with warfarin. Pharmacy to dose    Peripheral arterial disease: status post stenting in the 1990s, subsequent right lower extremity August 2016, left lower extremity November 2016  -Continues aspirin, atorvastatin, warfarin.     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  -Thoracic surgery to consult.       Clostridium difficile diarrhea:  -vancomycin 125 mg p.o. q.6 hours  -ID following    Acute on chronic anemia:  -baseline appears to be around 7-9  -Pt anticoagulated, but no hx of bleeding from any source  -Transfused 1 unit of PRBC on 1/30/17.    Hypernatremia  Sodium 147. Will monitor.     Involuntary movements of extremities.   Daughter reports he has been flailing his upper and lower extremities. Only occurs when he is in a deep sleep. Daughter woke him up and he reports he was having a bad dream. She asked if we could give him medications for anxiety. Will monitor for now as we may cause delirium. Not quite sure ? Myoclonic jerks, ?RLS? Anxiety? Night terrors.   Will give a trial of low dose benzo as patient and daughter report that it may be anxiety related. Will watch for signs of delirium.     DVT Prophylaxis: Warfarin  Code Status: Full Code    Disposition: Inpatient status. Expected discharge pending clinical course    Gumaro Mora MD  [id number]    Interval History   -Data reviewed today: I reviewed all new labs and imaging results over the last 24 hours. I personally reviewed  no images or EKG's today.    Physical Exam  Temp: 97.4  F (36.3  C) Temp src: Axillary BP: (!) 157/91 mmHg   Heart Rate: 80 Resp: (!) 31 SpO2: 95 % O2 Device: BiPAP/CPAP Oxygen Delivery: 15 LPM  Filed Vitals:    01/30/17 0400 01/31/17 0600 02/01/17 0613   Weight: 51.3 kg (113 lb 1.5 oz) 53.6 kg (118 lb 2.7 oz) 58.8 kg (129 lb 10.1 oz)     Vital Signs with Ranges  Temp:  [97.4  F (36.3  C)-98  F (36.7  C)] 97.4  F (36.3  C)  Heart Rate:  [71-85] 80  Resp:  [20-35] 31  BP: (140-161)/() 157/91 mmHg  FiO2 (%):  [45 %] 45 %  SpO2:  [90 %-97 %] 95 %  I/O last 3 completed shifts:  In: 600 [P.O.:600]  Out: 200 [Urine:200]    Constitutional: AAOX3, NAD,  appears frail  HEENT: Moist oral mucosa, no oral lesions, No pallor or icterus  Neck- Supple, Good ROM, No JVD  Respiratory:  Tachypneic, diminished at bases,slightly increased WOB  Cardiovascular: RRR, No murmur  GI: Soft, Non- tender, BS- normoactive, No Guarding/rebound/rigidity  Skin/Integument: Warm and dry, no rashes  MSK: No joint deformity or swelling, no edema  Neuro: CN- grossly intact      Medications    - MEDICATION INSTRUCTIONS -       - MEDICATION INSTRUCTIONS -       Warfarin Therapy Reminder         meropenem  500 mg Intravenous Q6H     predniSONE  40 mg Oral Daily     vancomycin  125 mg Oral 4x Daily     metoprolol (TOPROL-XL) 24 hr tablet 50 mg  50 mg Oral QPM     amiodarone  200 mg Oral Daily     aspirin  81 mg Oral Daily     atorvastatin (LIPITOR) tablet 40 mg  40 mg Oral At Bedtime     ipratropium - albuterol 0.5 mg/2.5 mg/3 mL  3 mL Nebulization 4x daily       Data    Recent Labs  Lab 02/01/17  0505 01/31/17  0746 01/30/17  2150 01/30/17  0325 01/29/17  0306   WBC  --  15.4*  --  10.0 9.9   HGB  --  8.2*  --  7.0* 8.4*   MCV  --  89  --  90 89   PLT  --  449  --  438 563*   INR 2.63* 2.21*  --  2.21* 1.91*   * 147*  --  147* 140   POTASSIUM 4.1 3.6 3.5 3.0* 3.6   CHLORIDE 111* 115*  --  114* 105   CO2 31 24  --  22 26   BUN 18 15  --  13 11    CR 0.75 0.64*  --  0.67 0.67   ANIONGAP 5 8  --  11 9   SHARI 7.9* 7.7*  --  7.3* 7.5*   * 118*  --  174* 114*   ALBUMIN  --   --   --   --  2.2*   PROTTOTAL  --   --   --   --  6.8   BILITOTAL  --   --   --   --  0.4   ALKPHOS  --   --   --   --  145   ALT  --   --   --   --  69   AST  --   --   --   --  111*   TROPI  --   --   --   --  0.016       Recent Results (from the past 24 hour(s))   CT Chest w/o Contrast    Narrative    CT CHEST WITHOUT CONTRAST 1/31/2017 1:41 PM    HISTORY: Cough. History of aspergillosis.    TECHNIQUE: Scans obtained from the apices through the diaphragm  without IV contrast. Radiation dose for this scan was reduced using  automated exposure control, adjustment of the mA and/or kV according  to patient size, or iterative reconstruction technique.    COMPARISON: 12/6/2016.    FINDINGS: Multiple mildly prominent lymph nodes in the mediastinum  again noted and unchanged from 12/6/2016. Calcification of the  thoracic aorta and coronary arteries. Previous sternotomy. Small  bilateral pleural effusions which have increased slightly in size  since 12/6/2016. Bronchiectasis and consolidation again noted in the  right upper lobe posteriorly. There is a cavity with filling defect  identified in this region which could represent an aspergilloma.  Bronchiectasis and consolidation in both lower lobes again noted and  essentially unchanged. Groundglass infiltration in the left upper lobe  and to a lesser extent the right middle lobe which has progressed  since 12/6/2016 and is consistent with pneumonitis. Emphysematous  changes again noted in both lungs. Remainder of the scan is negative  and unchanged.      Impression    IMPRESSION:   1. Cavitation and possible aspergilloma again noted in the right upper  lobe posteriorly which has not changed appreciably since 12/6/2016.  2. Bronchiectasis and consolidation in both lower lobes.  3. New groundglass infiltration in the left upper lobe and to a  lesser  extent the right middle lobe which may be secondary to pneumonitis.  4. Small bilateral pleural effusions which have increased in size.  5. Remainder of the scan is unchanged since 12/6/2016.    OZZIE NGUYEN MD

## 2017-02-01 NOTE — PROVIDER NOTIFICATION
Text page to Dr. Mora:   just arrived. Pt requiring bipap (rapidly desatting to low 80s on oxymizer). BP elevated 160/90s, all meds are PO. Would you consider  to IV as available? Thanks!

## 2017-02-01 NOTE — PROGRESS NOTES
[facility]    Infectious Disease Progress Note    Date of Service (when I saw the patient): 02/01/2017     Assessment and Plan  Harjit Garcia is a 76 year old male who was admitted on 1/29/2017.     Impression:    76 y.o male with chronic pulmonary nodules with pos cultures from an year ago and positive Galactomannan from last admission, currently on Voriconazole. Aspergillus did not grow in the cultures.    Also has baseline COPD was a smoker till the fall of 2016, more than 50 years of smoking.    In Novemeber was admitted with ACS but the stay was complicated with pneumonia acute bacterial ( Klebsiella and E coli on chronic inflammatory process of the lungs)    Was discharged to [facility] from where signed out AMA by family.    Admitted this admission with worsening respiratory status ( Hypoxic),  Some fevers, chills.    What is being described as rigors per family appears to be jerky movement with sleep.   Positive for C diff.   Hallucinations with Ativan.     Recommendations:    Consider alternative diagnosis such as chronic progressive respiratory inflammation of non infectious etiology. He is also on Amiodarone, Could that be responsible, consider stopping.   Also daughter and wife mentioned radiation injury when patient was still in [place] also talk about some secret biological weapon that was tested on Mr. Garcia, which caused a lot of deaths in [place] at that time.    Sputum positive for GNR again, on meropenem. ? Aspiration, superimposed infection on poor lungs.   Spoke with pulmonary they do not think bronchoscopy is required.   Already on voriconzaole for 6 plus weeks. Course complete, stop And with negative cultures and imaging which appears more like fungal ball doubt vori is helping much, spoke with Dr. Meade not a surgical candidate.   ? Neurology consult for abnormal movements in sleep.       Ted Gardner MD    Interval History  Afebrile, hallucinating after  ativan   Sputum cultures positive for GNR pending ID   More SOB     Physical Exam  Temp: 97.4  F (36.3  C) Temp src: Axillary BP: 154/89 mmHg   Heart Rate: 71 Resp: 25 SpO2: 92 % O2 Device: BiPAP/CPAP Oxygen Delivery: 15 LPM  Filed Vitals:    01/30/17 0400 01/31/17 0600 02/01/17 0613   Weight: 51.3 kg (113 lb 1.5 oz) 53.6 kg (118 lb 2.7 oz) 58.8 kg (129 lb 10.1 oz)     Vital Signs with Ranges  Temp:  [97.4  F (36.3  C)-98  F (36.7  C)] 97.4  F (36.3  C)  Heart Rate:  [71-85] 71  Resp:  [20-35] 25  BP: (140-154)/() 154/89 mmHg  FiO2 (%):  [45 %] 45 %  SpO2:  [90 %-97 %] 92 %    Constitutional: Awake, alert, more short of breath on bipap  Lungs: diminished breath sounds   Cardiovascular: Regular rate and rhythm, normal S1 and S2, and no murmur noted  Abdomen: Normal bowel sounds, soft, non-distended, non-tender  Skin: No rashes, no cyanosis, no edema  Other:    Medications    - MEDICATION INSTRUCTIONS -       - MEDICATION INSTRUCTIONS -       Warfarin Therapy Reminder         meropenem  500 mg Intravenous Q6H     predniSONE  40 mg Oral Daily     vancomycin  125 mg Oral 4x Daily     metoprolol (TOPROL-XL) 24 hr tablet 50 mg  50 mg Oral QPM     amiodarone  200 mg Oral Daily     aspirin  81 mg Oral Daily     atorvastatin (LIPITOR) tablet 40 mg  40 mg Oral At Bedtime     ipratropium - albuterol 0.5 mg/2.5 mg/3 mL  3 mL Nebulization 4x daily       Data  All microbiology laboratory data reviewed.  Recent Labs   Lab Test  01/31/17   0746  01/30/17   0325  01/29/17   0306   WBC  15.4*  10.0  9.9   HGB  8.2*  7.0*  8.4*   HCT  25.0*  21.2*  25.1*   MCV  89  90  89   PLT  449  438  563*     Recent Labs   Lab Test  02/01/17   0505  01/31/17   0746  01/30/17   0325   CR  0.75  0.64*  0.67     No lab results found.  Recent Labs   Lab Test  01/30/17   0500  01/29/17   0346  01/29/17   0306  12/12/16   1203  12/12/16   1130  12/10/16   1347  12/05/16   1341  12/05/16   1030  12/03/16   1020   CULT  Light growth Normal  jazzmine  Light growth Lactose fermenting gram negative rods  *  No growth after 3 days  No growth after 3 days  No growth  No growth  Light growth Escherichia coli  Light growth Klebsiella oxytoca  *  Moderate growth Escherichia coli  Moderate growth Klebsiella oxytoca  *  No growth  Culture negative for acid fast bacilli  Assayed at [facility] [address]    No growth after 4 weeks  Candida glabrata isolated  No additional fungi cultured after 4 weeks incubation  *  No Legionella species isolated  No Actinomyces species isolated  Moderate growth Normal jazzmine  Heavy growth Escherichia coli  Heavy growth Strain 2 Escherichia coli  *  Canceled, Test credited BRONCH LAVAGE RECEIVED, NO SPUTUM. BRONCH LAVAGE CULTURE   ALREADY ORDERED Charge credited

## 2017-02-01 NOTE — PLAN OF CARE
Problem: Goal Outcome Summary  Goal: Goal Outcome Summary  Outcome: No Change  VSS ex sats on 15lpm nasal oxymizer. LS coarse. Up +2 to bedside commode, BM+VOIDS+ iv pain ctr ok. indio diet.

## 2017-02-01 NOTE — PROGRESS NOTES
Pt on bipap all evening. Was found at 0300 with bipap mask off with dyspnea. SpO2 in the 70's. Increased Bipap setting to 12/8 60% FiO2 to allow SpO2 greater than 90%. Will cont to follow.     2/1/2017  Ginger Sosa RRT

## 2017-02-01 NOTE — PLAN OF CARE
Problem: Goal Outcome Summary  Goal: Goal Outcome Summary  Outcome: No Change   present for hospitalist, pulmonology and thoracic surgery consults. Pt appears restless, disoriented to time, appears forgetful. Prn Ativan given for anxiety, pt became more restless and agitated after, given Haldol and pt was able to rest. LS diminished, coarse, intermittent crackles, frequent productive cough with small amounts blood-tinged sputum. BPs elevated, prn IV Metoprolol. RR 26-40, increasing this afternoon even when pt at rest. Call to RT for assessment and prn neb, FlO2 @ 70%. Family involved at bedside, frequent questions re: care, when Bipap mask can be removed and when pt can eat, education provided with help of . At one point staff responded to bed and Bipap alarms, found that pt's spouse and daughter had helped him onto commode and disconnected Bipap tubing from machine, pt desatted to 74% at that time. Machine readjusted, sats improved, RT confirmed proper functioning, family educated about pt safety and importance of receiving assist from staff.

## 2017-02-01 NOTE — PROGRESS NOTES
THORACIC SURGERY    Seen re: aspergilloma right upper lobe lung    Unfortunately not a candidate for surgical resection based on overall respiratory status    Discussed with pt's son    FREDY VAIL MD [facility] ONCOLOGY THORACIC SURGERY  CELL:  [phone]  OFFICE: [phone]

## 2017-02-01 NOTE — PROGRESS NOTES
I got called for Pt assessment, and Pt complain of mask.   Pt RR on of the high 30's, Discussed with bed side Nurse, since Pt status deteriorating. Thus suggested ICU for close supervision.

## 2017-02-02 NOTE — PROGRESS NOTES
Critical Care Progress Note      02/01/2017    Name: Harjit Garcia MRN#: [id number]   Age: 76 year old YOB: 1940     Hsptl Day# 3  ICU DAY #    MV DAY #             Problem List:   Active Problems:    Acute on chronic respiratory failure (H)           Summary/Hospital Course:     76M with pmh of CAD s/p CABx1 a few months ago complicated by recurrent a fib and failure to wean from the vent resulting in trach/peg/LTACH.  He apparently did well at LTACH and was decanulated, but then left AMA.  He returned to the hospital with respiratory problems, and has had worsening respiratory status in step-down, now more tachypnic on bipap, so being transferred back to ICU.  He is actually fairly comfortable on arrival to MICU.  Breathing comfortably, mid 20s on bipap.  Satting 100% on 70% fio2, titrating down.  Will hold off on endotracheal intubation for now.      Assessment and plan :     Harjit Garcia IS a 76 year old male admitted on 1/29/2017 for respiratory failure.   I have personally reviewed the daily labs, imaging studies, cultures and discussed the case with referring physician and consulting physicians.     My assessment and plan by system for this patient is as follows:    Neurology/Psychiatry:   1. Per daughter he became fairly delirious on ativan. Prn haldol ordered--she says he does much better with that.    Cardiovascular:   1.  Hemodynamically stable.  bp ok.  Lactate normal.  2.  H/o CAD:  ASA, atorva, metoprolol  3.  H/o A fib:  Amiodarone, anticoag with coumadin    Pulmonary/Ventilator Management:   1. Respiratory failure:  Evidence of GGO on CT suspicious for pna (suspect aspiration), in addition to changes of chronic lung disease (bronchiectasis in b/l bases, possible fungus ball, etc). Viral panel neg.  Sputum cx with E-coli.  Will keep on bipap for now, but low threshold to intubate.  2.  Received lasix earlier today.  Monitoring i/o's to assess for further diuresis  needs.    Renal/Fluids/Electrolytes:   1. Creatinine stable.  Monitor.    Infectious Disease:   1. C diff: continue PO vanco  2.  E coli pna:  Repeat culture.  Continue aubree.  Empirically add vanco with worsening resp status.  3.  Repeat sputum cx    Endocrine:   1. Normoglycemic    Hematology/Oncology:   1. Hgb/pltlts stable    Nutrition:  NPO for now.    ICU Prophylaxis:   1. DVT: coumadin for a fib.  INR 2.63        Key goals for next 24 hours:   1.  Manage with bipap for now.  Low threshold to intubate  2.  sputum culture  3. Add vanco  4. NPO for now           Key Medications:       warfarin  0.5 mg Oral ONCE at 18:00     meropenem  500 mg Intravenous Q6H     predniSONE  40 mg Oral Daily     vancomycin  125 mg Oral 4x Daily     metoprolol (TOPROL-XL) 24 hr tablet 50 mg  50 mg Oral QPM     amiodarone  200 mg Oral Daily     aspirin  81 mg Oral Daily     atorvastatin (LIPITOR) tablet 40 mg  40 mg Oral At Bedtime     ipratropium - albuterol 0.5 mg/2.5 mg/3 mL  3 mL Nebulization 4x daily       - MEDICATION INSTRUCTIONS -       - MEDICATION INSTRUCTIONS -       Warfarin Therapy Reminder                 Physical Examination:   Temp:  [97.4  F (36.3  C)-98.3  F (36.8  C)] 98.3  F (36.8  C)  Heart Rate:  [71-89] 80  Resp:  [25-35] 33  BP: (149-161)/() 149/77 mmHg  FiO2 (%):  [60 %-70 %] 70 %  SpO2:  [90 %-97 %] 94 %    Intake/Output Summary (Last 24 hours) at 02/01/17 2039  Last data filed at 02/01/17 0832   Gross per 24 hour   Intake    560 ml   Output      0 ml   Net    560 ml     Wt Readings from Last 4 Encounters:   02/01/17 58.8 kg (129 lb 10.1 oz)   01/23/17 51 kg (112 lb 7 oz)   01/13/17 63.05 kg (139 lb)   12/18/16 63.1 kg (139 lb 1.8 oz)        FiO2 (%): 70 %  Resp: 33    Recent Labs  Lab 02/01/17  1906 01/30/17  1920   PH 7.50* 7.42   PCO2 40 30*   PO2 67* 87   HCO3 32* 20*       GEN: no acute distress, comfortable on bipap.  HEENT: head ncat, sclera anicteric, OP patent, trachea midline   PULM:  unlabored synchronous with bipap, clear anteriorly    CV/COR: RRR S1S2 no gallop,  No rub, no murmur  ABD: soft nontender, hypoactive bowel sounds, no mass  EXT:  warm  NEURO: grossly intact, communicative, normal strength x4.  SKIN: no obvious rash  LINES: clean, dry intact         Data:   All data and imaging reviewed     ROUTINE ICU LABS (Last four results)  CMP  Recent Labs  Lab 02/01/17  0505 01/31/17  0746 01/30/17  2150 01/30/17  0325 01/29/17  0306   * 147*  --  147* 140   POTASSIUM 4.1 3.6 3.5 3.0* 3.6   CHLORIDE 111* 115*  --  114* 105   CO2 31 24  --  22 26   ANIONGAP 5 8  --  11 9   * 118*  --  174* 114*   BUN 18 15  --  13 11   CR 0.75 0.64*  --  0.67 0.67   GFRESTIMATED >90Non  GFR Calc >90Non  GFR Calc  --  >90Non  GFR Calc >90Non  GFR Calc   GFRESTBLACK >90African American GFR Calc >90African American GFR Calc  --  >90African American GFR Calc >90African American GFR Calc   SHARI 7.9* 7.7*  --  7.3* 7.5*   MAG 1.9  --   --   --   --    PROTTOTAL  --   --   --   --  6.8   ALBUMIN  --   --   --   --  2.2*   BILITOTAL  --   --   --   --  0.4   ALKPHOS  --   --   --   --  145   AST  --   --   --   --  111*   ALT  --   --   --   --  69     CBC  Recent Labs  Lab 01/31/17  0746 01/30/17  0325 01/29/17  0306   WBC 15.4* 10.0 9.9   RBC 2.81* 2.35* 2.81*   HGB 8.2* 7.0* 8.4*   HCT 25.0* 21.2* 25.1*   MCV 89 90 89   MCH 29.2 29.8 29.9   MCHC 32.8 33.0 33.5   RDW 17.9* 17.6* 17.3*    438 563*     INR  Recent Labs  Lab 02/01/17  0505 01/31/17  0746 01/30/17  0325 01/29/17  0306   INR 2.63* 2.21* 2.21* 1.91*     Arterial Blood Gas  Recent Labs  Lab 02/01/17  1906 01/30/17  1920   PH 7.50* 7.42   PCO2 40 30*   PO2 67* 87   HCO3 32* 20*       All cultures:    Recent Labs  Lab 01/30/17  0500 01/29/17  0346 01/29/17  0306   CULT Light growth Normal floraLight growth Escherichia coli Susceptibility testing in progress* No growth after 3  days No growth after 3 days     No results found for this or any previous visit (from the past 24 hour(s)).      Billing: This patient is critically ill: Yes. Total critical care time today 35 min.

## 2017-02-02 NOTE — PROGRESS NOTES
ICU Update    Family meeting held today to update family over change in clinical status and urgent requirement to discuss further necessary management decision as patient unable/incompetent to participate.        Individuals present included daughter, son in law son, grandson as well Dr Gardner and PCP over phone    We reviewed initial presentation briefly and hospital course thus far with recent decompensation with multifactorial respiratory failure. Advised overall long term prognosis poor given accumulated injury/organ failure  in setting of recent extended ICU admission, deconditioning, chronic lung disease and malnutrition, .    We discussed future goals if her health worsens specifically in the case of a catastrophic event (cardiac event/code) and they agreed that ACLS/resuscitation would  only add to suffering without improving his long term outcome. In discussion with PCP decision to not pursue intubation if respiratory status worsening. In the short term signs of progress/healing would be increased comfort with noninvasive measures and continued medical management and follow response.     DNR/DNI order placed. Continue with morphine prn , haldol. Consider precedex to assist with sedation.     Morgan Ramos  Add 66 min CCT

## 2017-02-02 NOTE — PROGRESS NOTES
Critical Care Progress Note      02/02/2017    Name: Harjit Garcia MRN#: [id number]   Age: 76 year old YOB: 1940     [facility]    [facility] #             Problem List:   Active Problems:    Acute on chronic respiratory failure (H)           Summary/Hospital Course:     76M with pmh of CAD s/p CABx1 a few months ago complicated by recurrent a fib and failure to wean from the vent resulting in trach/peg/LTACH.  He apparently did well at LTACH and was decanulated, but then left AMA. Per family he had been doing well at home until this past weekend where he had an abupt event in middle of night with increased respiratory distress and desat into the 70s prompting his return to [facility] with subsequent admission to step down unit and with escalating BIPAP requirements since. Thoracic surgery, Pulmonary and ID consulted initially as CT Chest with new ground glass opacities, persistent aspergilloma. Patient deemed to unstable for bronch, amiodarone discontinued over concern for possible drug pneumonitis and steroid started as well diuresed over possible. ID work-up with ecoli in sputum as well positive for Cdiff.     He was briefly transferred to ICU night 2/1 with respiratory distress though on arrival to ICU patient looked comfortable beathing comfortably, mid 20s on bipap.  Satting 100% on 70% fio2, titrating down and thus observed and sent back to floor. Over the course of the day patient with continued respiratory difficulties with respiratory rates in the 30-40 and accessory muscle use. ABG on 13/8 with resp alkalosis and large A-a gradient.  Morphine was given with transient improvement however secondary to respiratory extremis patient transfer back to ICU.           Assessment and plan :   Harjit Garcia IS a 76 year old male admitted on 1/29/2017 for recurrent respiratory failure   I have personally reviewed the daily labs, imaging studies, cultures and discussed the case with  referring physician and consulting physicians.   My assessment and plan by system for this patient is as follows:    Neurology/Psychiatry:   1. Moderate distress - Per daughter he became fairly delirious on ativan. Prn haldol ordered--she says he does much better with that.  - prn morphine for air hunger   - prn morphine    Cardiovascular:   1.  Hemodynamically stable.  bp ok.  Lactate normal previoulsy .  2.  H/o CAD:  ASA, atorva, metoprolol  3.  H/o A fib:  Amiodarone, anticoag with coumadin    Pulmonary/Ventilator Management:   1. Respiratory failure:  Evidence of GGO on CT suspicious for pna (suspect aspiration), in addition to changes of chronic lung disease (bronchiectasis in b/l bases, possible fungus ball, etc). Viral panel neg.  Sputum cx with E-coli.  Try to optimize BIPAP waiting on family decision regarding intubation.   Too tenuous for bronch if proceed with intubation.    Given history of abrupt resp decompensation - ?thromboembolic event  2. Continue diuresis  3. Continue steroids   4. Stat echo (limited) and LE dopplers    Renal/Fluids/Electrolytes:   1. Creatinine stable.  Monitor.    Infectious Disease:   1. C diff: continue PO vanco  2.  E coli pna:  FUP ID directing abx  3.  Bronch if gets intubated    Endocrine:   1. Normoglycemic    Hematology/Oncology:   1. Hgb/pltlts stable    Nutrition:  NPO for now.    ICU Prophylaxis:   1. DVT: coumadin for a fib.  INR 2.63    FAMILY DISCUSSION - patient seen in SDU. Discussion regarding goals of care and prognosis given prior extended ICU and hospital stay, deconditioning, malnutrition and now recurrent events.  Family debating presently      Key goals for next 24 hours:   1. Morphine , adjust BIPAP   2. DNR/DNI  3  Ongoing discussion re goals of care.         Key Medications:       cefTRIAXone  1 g Intravenous Q24H     metroNIDAZOLE  500 mg Intravenous Q6H     warfarin  0.5 mg Oral ONCE at 18:00     predniSONE  40 mg Oral Daily     vancomycin  125 mg  Oral 4x Daily     metoprolol (TOPROL-XL) 24 hr tablet 50 mg  50 mg Oral QPM     aspirin  81 mg Oral Daily     atorvastatin (LIPITOR) tablet 40 mg  40 mg Oral At Bedtime     ipratropium - albuterol 0.5 mg/2.5 mg/3 mL  3 mL Nebulization 4x daily       - MEDICATION INSTRUCTIONS -       - MEDICATION INSTRUCTIONS -       Warfarin Therapy Reminder                 Physical Examination:   Temp:  [97.4  F (36.3  C)-98.3  F (36.8  C)] 97.9  F (36.6  C)  Heart Rate:  [] 101  Resp:  [18-45] 27  BP: (117-160)/() 137/89 mmHg  FiO2 (%):  [70 %-100 %] 100 %  SpO2:  [93 %-98 %] 97 %      Intake/Output Summary (Last 24 hours) at 02/02/17 1620  Last data filed at 02/02/17 0925   Gross per 24 hour   Intake    400 ml   Output    400 ml   Net      0 ml         Wt Readings from Last 4 Encounters:   02/01/17 55.2 kg (121 lb 11.1 oz)   01/23/17 51 kg (112 lb 7 oz)   01/13/17 63.05 kg (139 lb)   12/18/16 63.1 kg (139 lb 1.8 oz)     BP - Mean:  [102-125] 120  Location:  [-]   FiO2 (%): 100 %  Resp: 27    Recent Labs  Lab 02/02/17  1220 02/02/17  0435 02/01/17  1906 01/30/17  1920   PH 7.51* 7.45 7.50* 7.42   PCO2 39 47* 40 30*   PO2 63* 85 67* 87   HCO3 31* 33* 32* 20*   O2PER BIPAP85%  --   --   --        GEN: moderate distress, comfortable on bipap , cachexic  HEENT: head ncat, sclera anicteric, OP patent, trachea midline   PULM: coarse labored improved synchronous with bipap   CV/COR: tachycardic regular S1S2 no gallop, no rub, no murmur  ABD: soft nontender, hypoactive bowel sounds, no mass  EXT:  Warm no edema   NEURO: grossly intact, communicative, normal strength x4.intermittently agitated  SKIN: no obvious rash  LINES: clean, dry intact         Data:   All data and imaging reviewed     ROUTINE ICU LABS (Last four results)  CMP  Recent Labs  Lab 02/02/17  1029 02/02/17  0420 02/01/17  0505 01/31/17  0746  01/30/17  0325 01/29/17  0306   NA  --  149* 147* 147*  --  147* 140   POTASSIUM 3.5 3.1* 4.1 3.6  < > 3.0* 3.6    CHLORIDE  --  110* 111* 115*  --  114* 105   CO2  --  30 31 24  --  22 26   ANIONGAP  --  9 5 8  --  11 9   GLC  --  184* 129* 118*  --  174* 114*   BUN  --  19 18 15  --  13 11   CR  --  0.63* 0.75 0.64*  --  0.67 0.67   GFRESTIMATED  --  >90Non  GFR Calc >90Non  GFR Calc >90Non  GFR Calc  --  >90Non  GFR Calc >90Non  GFR Calc   GFRESTBLACK  --  >90African American GFR Calc >90African American GFR Calc >90African American GFR Calc  --  >90African American GFR Calc >90African American GFR Calc   SHARI  --  7.9* 7.9* 7.7*  --  7.3* 7.5*   MAG  --  2.1 1.9  --   --   --   --    PROTTOTAL  --   --   --   --   --   --  6.8   ALBUMIN  --   --   --   --   --   --  2.2*   BILITOTAL  --   --   --   --   --   --  0.4   ALKPHOS  --   --   --   --   --   --  145   AST  --   --   --   --   --   --  111*   ALT  --   --   --   --   --   --  69   < > = values in this interval not displayed.  CBC    Recent Labs  Lab 01/31/17  0746 01/30/17  0325 01/29/17  0306   WBC 15.4* 10.0 9.9   RBC 2.81* 2.35* 2.81*   HGB 8.2* 7.0* 8.4*   HCT 25.0* 21.2* 25.1*   MCV 89 90 89   MCH 29.2 29.8 29.9   MCHC 32.8 33.0 33.5   RDW 17.9* 17.6* 17.3*    438 563*     INR    Recent Labs  Lab 02/02/17  0420 02/01/17  0505 01/31/17  0746 01/30/17  0325   INR 2.46* 2.63* 2.21* 2.21*     Arterial Blood Gas    Recent Labs  Lab 02/02/17  1220 02/02/17  0435 02/01/17  1906 01/30/17  1920   PH 7.51* 7.45 7.50* 7.42   PCO2 39 47* 40 30*   PO2 63* 85 67* 87   HCO3 31* 33* 32* 20*   O2PER BIPAP85%  --   --   --        All cultures:    Recent Labs  Lab 01/30/17  0500 01/29/17  0346 01/29/17  0306   CULT Light growth Normal floraLight growth Escherichia coli* No growth after 4 days No growth after 4 days       Billing: This patient is critically ill: Yes. Total critical care time today 35 min.

## 2017-02-02 NOTE — PROGRESS NOTES
Called Respiratory therapist pt's breathing more labored at 48 FIO2 increased to 100% and pt repositioned. Dr. Mora in room talking with family and aware of blood gases.

## 2017-02-02 NOTE — PROGRESS NOTES
X cover    His chart reviewed as well as ID, pulmonary and thoracic surgery as well as CT scan 1/31.    Increased work of breathing in the 30-40's and patient full code.    Plan for stat ABG, transfer to ICU, and likely ETT.    Medardo Mariscal MD

## 2017-02-02 NOTE — PROGRESS NOTES
Will give a dose of morphine now. Discussed with ICU. Will transfer when bed opens. Currently full.     Gumaro Mora MD  [phone]

## 2017-02-02 NOTE — PROGRESS NOTES
[facility]    Infectious Disease Progress Note    Date of Service (when I saw the patient): 02/02/2017     Assessment and Plan  Harjit Garcia is a 76 year old male who was admitted on 1/29/2017.     Impression:    76 y.o male with chronic pulmonary nodules with pos cultures from an year ago and positive Galactomannan from last admission, currently on Voriconazole. Aspergillus did not grow in the cultures, now with fungal ball.   Also has baseline COPD was a smoker till the fall of 2016, more than 50 years of smoking.    In Novemeber was admitted with ACS but the stay was complicated with pneumonia acute bacterial ( Klebsiella and E coli on chronic inflammatory process of the lungs)    Was discharged to [facility] from where signed out AMA by family.    Admitted this admission with worsening respiratory status ( Hypoxic),  Some fevers, chills.    What is being described as rigors per family appears to be jerky movement with sleep.   Positive for C diff.   Hallucinations with Ativan.   Continues to have worsening of respiratory status.     Recommendations:    Long discussion with wife, son in law, grandson, Dr. Mora and Dr. Ramos mostly discussing goals of care, family waiting for daughter Dory to come over.   Consider alternative diagnosis such as chronic progressive respiratory inflammation of non infectious etiology. He was also on Amiodarone, Could that be responsible, stopped today.   Also daughter and wife mentioned radiation injury when patient was still in [place] also talk about some secret biological weapon that was tested on Mr. Garcia, which caused a lot of deaths in [place] at that time.    Sputum positive for E coli pretty susceptible, switching to ceftriaxone and flagyl.  ? Aspiration, superimposed infection on poor lungs. Doubt he needs Vancomycin, no MRSA in multiple cultures done so far, added toxicity.   Already on voriconzaole for 6 plus weeks. Course complete, stop  And with negative cultures for aspergillus and imaging which appears more like fungal ball doubt maria li is helping much, spoke with Dr. Meade not a surgical candidate.  Stopped voriconazole.         Ted Gardner MD    Interval History  Moved to ICU given respiratory status and then back to the third floor, in process of moving back to ICU, family discussing goals of care.   Breathing very labored again   Was started on Vancomycin   E coli in the sputum     Physical Exam  Temp: 97.4  F (36.3  C) Temp src: Axillary BP: 151/90 mmHg   Heart Rate: 96 Resp: 24 SpO2: 95 % O2 Device: BiPAP/CPAP    Filed Vitals:    01/31/17 0600 02/01/17 0613 02/01/17 2200   Weight: 53.6 kg (118 lb 2.7 oz) 58.8 kg (129 lb 10.1 oz) 55.2 kg (121 lb 11.1 oz)     Vital Signs with Ranges  Temp:  [97.4  F (36.3  C)-98.3  F (36.8  C)] 97.4  F (36.3  C)  Heart Rate:  [] 96  Resp:  [18-45] 24  BP: (117-160)/() 151/90 mmHg  FiO2 (%):  [60 %-80 %] 80 %  SpO2:  [91 %-98 %] 95 %    Constitutional: Awake, alert, even more short of breath on bipap  Lungs: diminished breath sounds, using all accessory muscles  Cardiovascular: Regular rate and rhythm, normal S1 and S2, and no murmur noted  Abdomen: Normal bowel sounds, soft, non-distended, non-tender  Skin: No rashes, no cyanosis, no edema  Other:    Medications    - MEDICATION INSTRUCTIONS -       - MEDICATION INSTRUCTIONS -       Warfarin Therapy Reminder         cefTRIAXone  1 g Intravenous Q24H     metroNIDAZOLE  500 mg Intravenous Q6H     warfarin  0.5 mg Oral ONCE at 18:00     predniSONE  40 mg Oral Daily     vancomycin  125 mg Oral 4x Daily     metoprolol (TOPROL-XL) 24 hr tablet 50 mg  50 mg Oral QPM     amiodarone  200 mg Oral Daily     aspirin  81 mg Oral Daily     atorvastatin (LIPITOR) tablet 40 mg  40 mg Oral At Bedtime     ipratropium - albuterol 0.5 mg/2.5 mg/3 mL  3 mL Nebulization 4x daily       Data  All microbiology laboratory data reviewed.  Recent Labs   Lab Test  01/31/17    0746  01/30/17   0325  01/29/17   0306   WBC  15.4*  10.0  9.9   HGB  8.2*  7.0*  8.4*   HCT  25.0*  21.2*  25.1*   MCV  89  90  89   PLT  449  438  563*     Recent Labs   Lab Test  02/02/17   0420  02/01/17   0505  01/31/17   0746   CR  0.63*  0.75  0.64*     No lab results found.  Recent Labs   Lab Test  01/30/17   0500  01/29/17   0346  01/29/17   0306  12/12/16   1203  12/12/16   1130  12/10/16   1347  12/05/16   1341  12/05/16   1030  12/03/16   1020   CULT  Light growth Normal jazzmine  Light growth Escherichia coli  *  No growth after 4 days  No growth after 4 days  No growth  No growth  Light growth Escherichia coli  Light growth Klebsiella oxytoca  *  Moderate growth Escherichia coli  Moderate growth Klebsiella oxytoca  *  No growth  Culture negative for acid fast bacilli  Assayed at [facility] [address]    No growth after 4 weeks  Candida glabrata isolated  No additional fungi cultured after 4 weeks incubation  *  No Legionella species isolated  No Actinomyces species isolated  Moderate growth Normal jazzmine  Heavy growth Escherichia coli  Heavy growth Strain 2 Escherichia coli  *  Canceled, Test credited BRONCH LAVAGE RECEIVED, NO SPUTUM. BRONCH LAVAGE CULTURE   ALREADY ORDERED Charge credited

## 2017-02-02 NOTE — PROGRESS NOTES
Pt feels SOB while on BiPAP with any movement per RN. FiO2 at 85%. ABG drawn per RN/RT discretion. Will continue to follow.    Radha Cabello RT

## 2017-02-02 NOTE — PROGRESS NOTES
Report called to [facility]. Pt's daughter, Dory, updated over the phone of transfer back to the floor, improvement of respiratory status. Belongings bag of clothing sent with patient to station 33.

## 2017-02-02 NOTE — PROGRESS NOTES
[facility]    Hospitalist Progress Note    Date of Service (when I saw the patient): 02/02/2017    Assessment and Plan  Harjit Garcia is a 76 year old male with complex medical history who presents with acute worsening of chronic hypoxia, chills.      Acute on chronic hypoxic respiratory failure:   -most likely etiologies include viral upper respiratory infection with poor pulmonary reserve, COPD exacerbation, aspiration pneumonia/pneumonitis, healthcare associated pneumonia, worsening Aspergilloma.    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  -Appreciate ID, Pulmonary and Thoracic surgery consults.   -More dependent on BiPAP. Sent to ICU on evening of 2/1/17. Returned to [facility] as his respiratory status reportedly improved with adjustment in bipap settings.   -Pleural effusions seen on CT chest. Given lasix 40 mg iv daily on 1/31 and 2/1.     -Solu-Medrol switched to prednisone 40 mg daily.   -Speech pathology consulted -DD2, nectar thick diet  -Pt has received multiple rounds of antibiotics, still has ongoing pulmonary symptoms, and now C. Difficile  -Per ID, currently on ceftriaxone and flagyl.    - Was on iv vanco, zosyn, levaquin, voriconazole.  -Repeat CT chest on 1/31/17 shows:  1. Cavitation and possible aspergilloma again noted in the right upper  lobe posteriorly which has not changed appreciably since 12/6/2016.  2. Bronchiectasis and consolidation in both lower lobes.  3. New groundglass infiltration in the left upper lobe and to a lesser  extent the right middle lobe which may be secondary to pneumonitis.  4. Small bilateral pleural effusions which have increased in size.  5. Remainder of the scan is unchanged since 12/6/2016.  -Dr Meade consulted. Deemed not a surgical candidate based on his overall respiratory status.   -Discontinued Amiodarone as recommended by ID and Pulmonary.     Coronary artery disease status post  coronary artery bypass grafting with residual lesion:   -patient with bypass of LAD late November 2016,   -still requires stenting of left main and obtuse marginal branches.  -Had follow up scheduled for 1/30/17 with Dr. Schwab for reassessment.  Discussed with  and care coordinator to inform Dr. Schwab's office about patient's admission.  Rescheduling appointment.  -Continue metoprolol extended release 50 mg daily, aspirin 81 mg daily and Lipitor 40 mg daily   -Cardiology consult if new issues come up regarding his coronary artery disease    Atrial fibrillation, paroxysmal:  -Continue metoprolol  -Currently on normal sinus rhythm  -Anticoagulated with warfarin. Pharmacy dosing.    Peripheral arterial disease: status post stenting in the 1990s, subsequent right lower extremity August 2016, left lower extremity November 2016  -Continues aspirin, atorvastatin, warfarin.     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  -Thoracic surgery to consult.       Clostridium difficile diarrhea:  -vancomycin 125 mg p.o. q.6 hours  -ID following    Acute on chronic anemia:  -baseline appears to be around 7-9  -Pt anticoagulated, but no hx of bleeding from any source  -Transfused 1 unit of PRBC on 1/30/17.    Hypernatremia  Sodium 149. Will monitor.     Involuntary movements of extremities.   Daughter reports he has been flailing his upper and lower extremities. Only occurs when he is in a deep sleep. Daughter woke him up and he reports he was having a bad dream. She asked if we could give him medications for anxiety. Will monitor for now as we may cause delirium. Not quite sure ? Myoclonic jerks, ?RLS? Anxiety? Night terrors.   Trial of haldol if needed for agitation/anxiety. Family reports he had delirium with benzos in past.     DVT Prophylaxis: Warfarin  Code Status: Full Code.  Tried broaching subject again with patient, son in law and wife today. They recognize the situation is not looking good and there is a possibility he may need to be intubated soon. Asked them to consider changing his code status to DNR/DNI as I do not think he will have a good outcome even with all available treatment measures. May just prolong suffering. Want to remain Full Code until family can think about it and discuss amongst themselves further.     Disposition: Inpatient status. Will talk with Intensivist. Likely needs to go back to ICU.     Gumaro Mora MD  [phone]    Interval History   Tachypneic. Respiratory therapy helping adjust bipap settings.     -Data reviewed today: I reviewed all new labs and imaging results over the last 24 hours. I personally reviewed no images or EKG's today.    Physical Exam  Temp: 97.9  F (36.6  C) (axillary) Temp src: Axillary BP: (!) 159/95 mmHg   Heart Rate: 105 Resp: (!) 35 SpO2: 95 % O2 Device: BiPAP/CPAP    Filed Vitals:    01/31/17 0600 02/01/17 0613 02/01/17 2200   Weight: 53.6 kg (118 lb 2.7 oz) 58.8 kg (129 lb 10.1 oz) 55.2 kg (121 lb 11.1 oz)     Vital Signs with Ranges  Temp:  [97.4  F (36.3  C)-98.3  F (36.8  C)] 97.9  F (36.6  C)  Heart Rate:  [] 105  Resp:  [18-45] 35  BP: (117-160)/() 159/95 mmHg  FiO2 (%):  [70 %-85 %] 85 %  SpO2:  [93 %-98 %] 95 %  I/O last 3 completed shifts:  In: 600 [P.O.:200; I.V.:400]  Out: 350 [Urine:350]    Constitutional: Awake, tachypneic. Frail  HEENT: No scleral icterus.   Neck- Supple, Good ROM, No JVD  Respiratory:  Tachypneic, diminished at bases,slightly increased WOB  Cardiovascular: RRR, No murmur  GI: Soft, Non- tender, BS- normoactive, No Guarding/rebound/rigidity  Skin/Integument: Warm and dry, no rashes  MSK: No joint deformity or swelling, no edema  Neuro: CN- grossly intact      Medications    - MEDICATION INSTRUCTIONS -       - MEDICATION INSTRUCTIONS -       Warfarin Therapy Reminder         cefTRIAXone   1 g Intravenous Q24H     metroNIDAZOLE  500 mg Intravenous Q6H     warfarin  0.5 mg Oral ONCE at 18:00     predniSONE  40 mg Oral Daily     vancomycin  125 mg Oral 4x Daily     metoprolol (TOPROL-XL) 24 hr tablet 50 mg  50 mg Oral QPM     aspirin  81 mg Oral Daily     atorvastatin (LIPITOR) tablet 40 mg  40 mg Oral At Bedtime     ipratropium - albuterol 0.5 mg/2.5 mg/3 mL  3 mL Nebulization 4x daily       Data    Recent Labs  Lab 02/02/17  1029 02/02/17  0420 02/01/17  0505 01/31/17  0746  01/30/17  0325 01/29/17  0306   WBC  --   --   --  15.4*  --  10.0 9.9   HGB  --   --   --  8.2*  --  7.0* 8.4*   MCV  --   --   --  89  --  90 89   PLT  --   --   --  449  --  438 563*   INR  --  2.46* 2.63* 2.21*  --  2.21* 1.91*   NA  --  149* 147* 147*  --  147* 140   POTASSIUM 3.5 3.1* 4.1 3.6  < > 3.0* 3.6   CHLORIDE  --  110* 111* 115*  --  114* 105   CO2  --  30 31 24  --  22 26   BUN  --  19 18 15  --  13 11   CR  --  0.63* 0.75 0.64*  --  0.67 0.67   ANIONGAP  --  9 5 8  --  11 9   SHARI  --  7.9* 7.9* 7.7*  --  7.3* 7.5*   GLC  --  184* 129* 118*  --  174* 114*   ALBUMIN  --   --   --   --   --   --  2.2*   PROTTOTAL  --   --   --   --   --   --  6.8   BILITOTAL  --   --   --   --   --   --  0.4   ALKPHOS  --   --   --   --   --   --  145   ALT  --   --   --   --   --   --  69   AST  --   --   --   --   --   --  111*   TROPI  --   --   --   --   --   --  0.016   < > = values in this interval not displayed.    No results found for this or any previous visit (from the past 24 hour(s)).

## 2017-02-02 NOTE — PROGRESS NOTES
PULMONARY PROGRESS NOTE          Interval History:      Seen with interpretor. Worse today, more fatigued and requiring NIPPV.         Physical Exam:      Blood pressure 149/77, pulse 82, temperature 97.9  F (36.6  C), temperature source Axillary, resp. rate 33, weight 58.8 kg (129 lb 10.1 oz), SpO2 94 %.  Filed Vitals:    01/30/17 0400 01/31/17 0600 02/01/17 0613   Weight: 51.3 kg (113 lb 1.5 oz) 53.6 kg (118 lb 2.7 oz) 58.8 kg (129 lb 10.1 oz)     Vital Signs with Ranges  Temp:  [97.4  F (36.3  C)-97.9  F (36.6  C)] 97.9  F (36.6  C)  Heart Rate:  [71-89] 80  Resp:  [20-35] 33  BP: (149-161)/() 149/77 mmHg  FiO2 (%):  [60 %-70 %] 70 %  SpO2:  [90 %-97 %] 94 %  I/O's Last 24 hours  I/O last 3 completed shifts:  In: 800 [P.O.:800]  Out: 200 [Urine:200]    Lungs: Coarse, dec   Cardiovascular: rrr   Other: Recent trach stoma with bandage. abd soft.               Medications:          warfarin  0.5 mg Oral ONCE at 18:00     meropenem  500 mg Intravenous Q6H     predniSONE  40 mg Oral Daily     vancomycin  125 mg Oral 4x Daily     metoprolol (TOPROL-XL) 24 hr tablet 50 mg  50 mg Oral QPM     amiodarone  200 mg Oral Daily     aspirin  81 mg Oral Daily     atorvastatin (LIPITOR) tablet 40 mg  40 mg Oral At Bedtime     ipratropium - albuterol 0.5 mg/2.5 mg/3 mL  3 mL Nebulization 4x daily            Data:      All new lab and imaging data was reviewed.   Recent Labs   Lab Test  02/01/17   0505  01/31/17   0746  01/30/17   0325  01/29/17   0306   WBC   --   15.4*  10.0  9.9   HGB   --   8.2*  7.0*  8.4*   MCV   --   89  90  89   PLT   --   449  438  563*   INR  2.63*  2.21*  2.21*  1.91*      Recent Labs   Lab Test  02/01/17   0505  01/31/17   0746  01/30/17   2150  01/30/17   0325   NA  147*  147*   --   147*   POTASSIUM  4.1  3.6  3.5  3.0*   CHLORIDE  111*  115*   --   114*   CO2  31  24   --   22   BUN  18  15   --   13   CR  0.75  0.64*   --   0.67   ANIONGAP  5  8   --   11   SHARI  7.9*  7.7*   --   7.3*   GLC   129*  118*   --   174*        I reviewed the patient's new clinical lab test results.     I reviewed the patient's new imaging test results.     I discussed the patient's care with Drs  Jj Walter, and Deshaun.    Active Problems:    Acute on chronic respiratory failure (H)           Assessment and Plan:      75 yo non-English speaking Belgian male with a complicated medical hx is admitted with acute on chronic respiratory failure. CXR on admission showed mild hazy opacities in the mid and inferior right lung and less so in the inferior left lung, similar in appearance to 1/13/17. 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 Repeat CT chest with stable cavitation and presumed aspergilloma. Some new scattered GGOs with increased bilateral effusions.    -continue abx  -wean o2 as tolerated  -too unstable for bronchoscopy unless decompensates and intubated  -continue steroids  -agree with stopping amiodarone      Karlo Davis  [facility]  Office: [phone]  Pager: [phone]

## 2017-02-02 NOTE — PROVIDER NOTIFICATION
Spoke with Dr. Mariscal. Pt's status has improved. Okay to transfer back to the floor out of the ICU. Awaiting transfer orders.

## 2017-02-02 NOTE — PLAN OF CARE
Problem: Goal Outcome Summary  Goal: Goal Outcome Summary  Outcome: No Change  VSS ex RR 35-45 on max bi-pap sent to icu 2200

## 2017-02-02 NOTE — PROGRESS NOTES
Pulmonary Note    Reviewed chart, he is doing poorly and transfer to ICU is in progress, waiting for a bed there. Care will be transferrred to ICU team. Please call if we can help.     KRGromerMD  [phone]

## 2017-02-02 NOTE — PROGRESS NOTES
X cover    ABG recheck and improved, ICU in need of bed.  Patient mentating at baseline.    Will transfer patient back to 3 under IMC status.    Medardo Mariscal MD

## 2017-02-02 NOTE — PHARMACY-VANCOMYCIN DOSING SERVICE
Pharmacy Vancomycin Initial Note  Date of Service 2017  Patient's  1940  76 year old, male    Indication: Healthcare-Associated Pneumonia    Current estimated CrCl = Estimated Creatinine Clearance: 67.5 mL/min (based on Cr of 0.75).    Creatinine for last 3 days  2017:  3:25 AM Creatinine 0.67 mg/dL  2017:  7:46 AM Creatinine 0.64 mg/dL*  2017:  5:05 AM Creatinine 0.75 mg/dL    Recent Vancomycin Level(s) for last 3 days  No results found for requested labs within last 3 days.      Vancomycin IV Administrations (past 72 hours)                   vancomycin (VANOCIN) solution 125 mg (mg) 125 mg Given 17 2043     125 mg Given  1728     125 mg Given  1219     125 mg Given 17 2337     125 mg Given  1755     125 mg Given  1255                Nephrotoxins and other renal medications (Future)    Start     Dose/Rate Route Frequency Ordered Stop    17 0030  vancomycin (VANCOCIN) 1000 mg in dextrose 5% 200 mL PREMIX      1,000 mg Intravenous EVERY 12 HOURS 17 0007      17 1300  vancomycin (VANOCIN) solution 125 mg      125 mg Oral 4 TIMES DAILY 17 1107            Contrast Orders - past 72 hours     None                Plan:  1.  Start vancomycin  1000 mg IV q12h.   2.  Goal Trough Level: 15-20 mg/L   3.  Pharmacy will check trough levels as appropriate in 1-3 Days.    4. Serum creatinine levels will be ordered daily for the first week of therapy and at least twice weekly for subsequent weeks.    5. Golva method utilized to dose vancomycin therapy: Method 1    Akil Aliheyder

## 2017-02-02 NOTE — PROGRESS NOTES
Transfer order for transferring to ICU in place Dr. Mora states to me when ICU has a bed. Dr. Mora has ordered a one time dose of Morphine see emar

## 2017-02-02 NOTE — PROGRESS NOTES
Called respiratory therapist for breathing more labored and Bipap FIO2 increased to 85% nebulizer due and RT will implement and draw arterial blood gases son in law remains in room at bedside

## 2017-02-02 NOTE — PROGRESS NOTES
abg drawn and sent to lab.  Pt remains on bipap 12/8 at 80%. RR 44  Ve 19.3 pip 14.   Tracy Baum RRT

## 2017-02-02 NOTE — PLAN OF CARE
Problem: Goal Outcome Summary  Goal: Goal Outcome Summary  SLP: Pt remains on BiPap. RN says cx visit today. SLP will follow up tomorrow.

## 2017-02-03 NOTE — PROGRESS NOTES
Critical Care Progress Note      02/03/2017    Name: Harjit Garcia MRN#: [id number]   Age: 76 year old YOB: 1940     [facility]  [facility] #    MV DAY #             Problem List:   Active Problems:    Acute on chronic respiratory failure (H)           Summary/Hospital Course:     76M with pmh of CAD s/p CABx1 a few months ago complicated by recurrent a fib and failure to wean from the vent resulting in trach/peg/LTACH.  He apparently did well at LTACH and was decanulated, but then left AMA. Per family he had been doing well at home until this past weekend where he had an abupt event in middle of night with increased respiratory distress and desat into the 70s prompting his return to [facility] with subsequent admission to step down unit and with escalating BIPAP requirements since. Thoracic surgery, Pulmonary and ID consulted initially as CT Chest with new ground glass opacities, persistent aspergilloma. Patient deemed to unstable for bronch, amiodarone discontinued over concern for possible drug pneumonitis and steroid started as well diuresed over possible. ID work-up with ecoli in sputum as well positive for Cdiff.     He was briefly transferred to ICU night 2/1 with respiratory distress though on arrival to ICU patient looked comfortable beathing comfortably, mid 20s on bipap.  Satting 100% on 70% fio2, titrating down and thus observed and sent back to floor. Over the course of the day patient with continued respiratory difficulties with respiratory rates in the 30-40 and accessory muscle use. ABG on 13/8 with resp alkalosis and large A-a gradient.  Morphine was given with transient improvement however secondary to respiratory extremis patient transfer back to ICU.     2/1 - transferred from SDU, made DNr./DNI after extended conversation with family         Interim History  :     - transferred from SDU, made DNR/DNI, went into rapid atrial fibrillation 140, precipitate by distress, marginally  response to metoprolol improved with escalating doses of morphine          Assessment and plan :   Harjit Garcia IS a 76 year old male admitted on 1/29/2017 for recurrent respiratory failure   I have personally reviewed the daily labs, imaging studies, cultures and discussed the case with referring physician and consulting physicians.   My assessment and plan by system for this patient is as follows:    Neurology/Psychiatry:   1. Moderate distress - 2/2 acute illness  - prn morphine for air hunger   - prn haldol       Cardiovascular:   1.  Rapid atrial fibrillation exacerbated by acute illness  - prn metoprolol   2.  ECHO with elevated RV pressures - ? mild increase from prior explained by worsening resp status.     Pulmonary/Ventilator Management:   1. Respiratory failure:  Evidence of GGO on CT suspicious for pna (suspect aspiration), vs inflammatory lung disease in addition to changes of chronic lung disease (bronchiectasis in b/l bases,  fungus ball, etc). Viral panel neg.  Sputum cx with E-coli.  Family discussion yesterday DNR/DNI - trial optimize bipap Ongoing discussion regarding comfort measures Given history of abrupt resp decompensation - ?thromboembolic however LE dopplers negative and ECHO not far from baseline.   - Unfortunately he is continuing to struggle despite treating reversible causes (infection, fluid , inflammation) and is in the process of dying    Renal/Fluids/Electrolytes:   1. Creatinine stable. Electrolytes low - replace - potentially exacerbating arrhythmia. Monitor.    Infectious Disease:   1. C diff: continue PO vanco  2.  E coli pna:  FUP ID directing abx    Endocrine:   1. Normoglycemic    Hematology/Oncology:   1. Hgb/pltlts stable    Nutrition:  NPO for now.    ICU Prophylaxis:   1. DVT: coumadin for a fib.  INR 2.63    FAMILY DISCUSSION - patient seen in SDU. Discussion regarding goals of care and prognosis given prior extended ICU and hospital stay, deconditioning,  malnutrition and now recurrent events.  Family debating presently      Key goals for next 24 hours:   1. Morphine, adjust BIPAP > see if could tolerate hiflo for comfort   2. Ongoing discussion re goals of care.  3. Palliative Consult          Key Medications:       metoprolol  5 mg Intravenous Q6H     warfarin  0.5 mg Oral or Feeding Tube ONCE at 18:00     cefTRIAXone  1 g Intravenous Q24H     metroNIDAZOLE  500 mg Intravenous Q6H     predniSONE  40 mg Oral Daily     vancomycin  125 mg Oral 4x Daily     aspirin  81 mg Oral Daily     atorvastatin (LIPITOR) tablet 40 mg  40 mg Oral At Bedtime     ipratropium - albuterol 0.5 mg/2.5 mg/3 mL  3 mL Nebulization 4x daily       esmolol       dexmedetomidine 0.5 mcg/kg/hr (02/03/17 0740)     - MEDICATION INSTRUCTIONS -       - MEDICATION INSTRUCTIONS -       Warfarin Therapy Reminder                 Physical Examination:   Temp:  [97.4  F (36.3  C)-98.6  F (37  C)] 98.1  F (36.7  C)  Heart Rate:  [] 140  Resp:  [17-45] 30  BP: ()/() 110/71 mmHg  FiO2 (%):  [70 %-100 %] 70 %  SpO2:  [93 %-100 %] 98 %      Intake/Output Summary (Last 24 hours) at 02/03/17 1103  Last data filed at 02/03/17 1100   Gross per 24 hour   Intake 483.05 ml   Output    785 ml   Net -301.95 ml       Wt Readings from Last 4 Encounters:   02/03/17 53.5 kg (117 lb 15.1 oz)   01/23/17 51 kg (112 lb 7 oz)   01/13/17 63.05 kg (139 lb)   12/18/16 63.1 kg (139 lb 1.8 oz)     BP - Mean:  [] 83  Location:  [-]   FiO2 (%): 70 % (14/8)  Resp: 30    Recent Labs  Lab 02/02/17  1220 02/02/17  0435 02/01/17  1906 01/30/17  1920   PH 7.51* 7.45 7.50* 7.42   PCO2 39 47* 40 30*   PO2 63* 85 67* 87   HCO3 31* 33* 32* 20*   O2PER BIPAP85%  --   --   --        GEN: moderate distress with bipap , cachexic  HEENT: head ncat, sclera anicteric, OP patent, dry mucous membranes , trachea midline   PULM: coarse labored improved synchronous with bipap +accessory muscle use   CV/COR: tachycardic irregular   S1S2 no gallop, no rub, no murmur  ABD: soft nontender, hypoactive bowel sounds, no mass  EXT:  Warm no edema   NEURO: moving all 4 extremities   SKIN: no obvious rash  LINES: clean, dry intact         Data:   All data and imaging reviewed     ROUTINE ICU LABS (Last four results)  CMP  Recent Labs  Lab 02/03/17  0500 02/02/17  1029 02/02/17  0420 02/01/17  0505 01/31/17  0746  01/30/17  0325 01/29/17  0306   NA  --   --  149* 147* 147*  --  147* 140   POTASSIUM 2.9* 3.5 3.1* 4.1 3.6  < > 3.0* 3.6   CHLORIDE  --   --  110* 111* 115*  --  114* 105   CO2  --   --  30 31 24  --  22 26   ANIONGAP  --   --  9 5 8  --  11 9   GLC  --   --  184* 129* 118*  --  174* 114*   BUN  --   --  19 18 15  --  13 11   CR 0.67  --  0.63* 0.75 0.64*  --  0.67 0.67   GFRESTIMATED >90Non  GFR Calc  --  >90Non  GFR Calc >90Non  GFR Calc >90Non  GFR Calc  --  >90Non  GFR Calc >90Non  GFR Calc   GFRESTBLACK >90African American GFR Calc  --  >90African American GFR Calc >90African American GFR Calc >90African American GFR Calc  --  >90African American GFR Calc >90African American GFR Calc   SHARI  --   --  7.9* 7.9* 7.7*  --  7.3* 7.5*   MAG 2.1  --  2.1 1.9  --   --   --   --    PROTTOTAL  --   --   --   --   --   --   --  6.8   ALBUMIN  --   --   --   --   --   --   --  2.2*   BILITOTAL  --   --   --   --   --   --   --  0.4   ALKPHOS  --   --   --   --   --   --   --  145   AST  --   --   --   --   --   --   --  111*   ALT  --   --   --   --   --   --   --  69   < > = values in this interval not displayed.  CBC    Recent Labs  Lab 01/31/17  0746 01/30/17  0325 01/29/17  0306   WBC 15.4* 10.0 9.9   RBC 2.81* 2.35* 2.81*   HGB 8.2* 7.0* 8.4*   HCT 25.0* 21.2* 25.1*   MCV 89 90 89   MCH 29.2 29.8 29.9   MCHC 32.8 33.0 33.5   RDW 17.9* 17.6* 17.3*    438 563*     INR    Recent Labs  Lab 02/03/17  0500 02/02/17  0420 02/01/17  0505  01/31/17  0746   INR 2.64* 2.46* 2.63* 2.21*     Arterial Blood Gas    Recent Labs  Lab 02/02/17  1220 02/02/17  0435 02/01/17  1906 01/30/17  1920   PH 7.51* 7.45 7.50* 7.42   PCO2 39 47* 40 30*   PO2 63* 85 67* 87   HCO3 31* 33* 32* 20*   O2PER BIPAP85%  --   --   --        All cultures:    Recent Labs  Lab 01/30/17  0500 01/29/17  0346 01/29/17  0306   CULT Light growth Normal floraLight growth Escherichia coli* No growth after 5 days No growth after 5 days       Billing: This patient is critically ill: Yes. Total critical care time today 80 min.

## 2017-02-03 NOTE — PROGRESS NOTES
MD DEATH PRONOUNCEMENT    Called to pronounce Harjit Garcia dead.    Physical Exam: Unresponsive to noxious stimuli, Spontaneous respirations absent, Breath sounds absent, Carotid pulse absent and Heart sounds absent    Patient was pronounced dead at 4:30 PM, February 3, 2017.    Rest in peace, Mr Garcia.     Morgan Ramos MD

## 2017-02-03 NOTE — PROGRESS NOTES
Hospitalist update note    Patient's family had care conference with Intensivist and Palliative Care earlier this morning. Many family members present at bedside. Introduced myself to the patient's family and confirmed plan of care.     - biPAP still in place with plan to progress toward comfort measures with removal of biPAP  - Patient's death imminent off biPAP  - Comfort medications available    VINAY Toure  Hospitalist  [phone]

## 2017-02-03 NOTE — CONSULTS
[facility]    Palliative Care Consultation     Harjit Garcia  MRN# [id number]  Date of Admission:  1/29/2017  Date of Service (when I saw the patient): 02/03/2017  Reason for consult: Consulted by Dr. Sanders for Patient and family support    Assessment and Plan  Harjit Garcia is a 76 year old male with PMH significant for CAD s/p recent CABG complicated by recurrent afib and failure to ween off vent which resulted in trach/peg/LTAC.  He improved, trach decannulated but then he signed out AMA and when home with 24 hr care and was doing at home according to family until a week or so prior to admission.  He presented on 1/29, with increasing shortness of breath which worsened after admission and  transferred to the ICU.      Discussed our potential roles for symptom management, support/coping, and decisional support (aka goals of care).     Goals of care discussions:   Last night Dr. Sanders and Dr. Gardner met with family and had PCP on the phone and had care conference to explain Mr. Garcia's multifactorial respiratory decompensation, DNR/DNI was agreed upon since it would not improve his prognosis.  Today Dr. Sanders and I met with the family and explained that Mr. Garcia's medical status continues to decline despite all current measures and that he is dying. We answered all of the family's questions and they understand that he is dying and want to be sure he is not suffering. We explained that we would continue to give him medication to prevent him from feeling SOB and pain and then meet again in the afternoon to further discuss option of only focusing on comfort and stopping all medications not contributing to comfort.     Symptom Recommendations:   Dyspnea: pt with increased resp rate on bipap and appears uncomfortable - titrate morphine as needed to prevent pt from struggling to breath. Has used 20mg IV morphine x 12 hrs and now requiring doses more frequently. Will try to   -  increase morphine 4-8mg IV q30 min PRN  - haldol 1-2mg IV q4hrs PRN for agitation, anxiety, restlessness  - avoid benzos as family states they cause halucinations    Support/Coping  Family at bedside, very supportive of each other, no additional support needed at this time    Decisional Support, Goals of Care, Counseling & Coordination  Decisional Capacity Intact?  -no  Health Care Directive on File?  -no  Code Status/Resuscitation Preferences?  -DNR/DNI      Thank you for involving us in the care of this patient and family. We will continue to follow at this time. Please do not hesitate to contact me with questions or concerns or the on-call provider for our team if evening or weekend.    Nelly Gunter  Palliative Care Physician  Pager [phone]      Attestation:  Total time on the floor involved in the patient's care: 75 minutes  Total time spent in counseling/care coordination: >50%    Chief Complaint  Family support and symptom management    History is obtained from the patient's daughter and extensive chart review.     Past Medical History   I have reviewed this patient's medical history and updated it with pertinent information if needed.   Past Medical History   Diagnosis Date     Hypertension      BPH (benign prostatic hyperplasia)      Respiratory arrest (H)      Insomnia      Vitamin D deficiency      Lumbar facet arthropathy (H)      Diverticulosis      Carotid artery stenosis      Depression      Afib (H)      Diverticulosis      PAD (peripheral artery disease) (H)      Macular degeneration      Orthostatic hypotension      NSTEMI (non-ST elevated myocardial infarction) (H)        Past Surgical History  I have reviewed this patient's surgical history and updated it with pertinent information if needed.  Past Surgical History   Procedure Laterality Date     Ir stent vascular rt       Bronchoscopy flexible and rigid N/A 3/18/2016     Procedure: BRONCHOSCOPY FLEXIBLE AND RIGID;  Surgeon: Timothy  "Roderick Morgan MD;  Location:  GI     Vascular surgery       Orthopedic surgery       high frequency lumbar facet nerve ablation     Bypass graft insitu femoropopliteal Right 8/3/2016     Procedure: BYPASS GRAFT INSITU FEMOROPOPLITEAL;  Surgeon: Alli Sood MD;  Location:  OR     Endarterectomy femoral Right 8/3/2016     Procedure: ENDARTERECTOMY FEMORAL;  Surgeon: Alli Sood MD;  Location:  OR     Esophagoscopy, gastroscopy, duodenoscopy (egd), combined N/A 9/5/2016     Procedure: COMBINED ESOPHAGOSCOPY, GASTROSCOPY, DUODENOSCOPY (EGD), BIOPSY SINGLE OR MULTIPLE;  Surgeon: Aziza Becker MD;  Location:  GI     Bypass graft femoropopliteal Left 11/11/2016     Procedure: BYPASS GRAFT FEMOROPOPLITEAL;  Surgeon: Alli Sood MD;  Location:  OR     Bypass graft artery coronary N/A 11/27/2016     Procedure: BYPASS GRAFT ARTERY CORONARY;  Surgeon: Mihir Hand MD;  Location:  OR     Tracheostomy N/A 12/7/2016     Procedure: TRACHEOSTOMY;  Surgeon: Hima Meade MD;  Location:  OR     Esophagoscopy, gastroscopy, duodenoscopy (egd), combined N/A 12/9/2016     Procedure: COMBINED ESOPHAGOSCOPY, GASTROSCOPY, DUODENOSCOPY (EGD), BIOPSY SINGLE OR MULTIPLE;  Surgeon: Beatrice Lindquist MD;  Location:  GI     Bronchoscopy flexible and rigid N/A 12/9/2016     Procedure: BRONCHOSCOPY FLEXIBLE AND RIGID;  Surgeon: Jer Cook MD;  Location:  GI       Social History    Family system: wife and 2 daughters at bedside along with grandson, son-in-law    [place] affiliation / involvement in [place] community: no      Family History  I have reviewed this patient's family history and updated it with pertinent information if needed.   No family history on file.    Allergies  Allergies   Allergen Reactions     Iodine-131 Hives     Patient became red and got \"spots\" on his body after having CT dye.     Nuts [Peanut-Derived]      Neri walnuts     Pletal " [Cilostazol]      Found in H and P       Medications  I have reviewed all medications    precedex  Haldol 1-2mg IV q4hrs PRN  Morphine 2-4mg IV q30 min PRN - 20mg x 12 hrs given         Physical Exam  Temp: 98.1  F (36.7  C) Temp src: Axillary BP: 110/71 mmHg   Heart Rate: 140 Resp: 30 SpO2: 98 % O2 Device: BiPAP/CPAP    Filed Vitals:    02/01/17 0613 02/01/17 2200 02/03/17 0400   Weight: 58.8 kg (129 lb 10.1 oz) 55.2 kg (121 lb 11.1 oz) 53.5 kg (117 lb 15.1 oz)     General: bipap in place, eyes closed, appears stated age, frail, in mild respiratory distress.   Lungs: on bipap, RR 30-40; using accessory muscles to breath, in mild distress  Neuropsych: not responding to voice    Data  Results for orders placed or performed during the hospital encounter of 01/29/17 (from the past 24 hour(s))   Potassium   Result Value Ref Range    Potassium 3.5 3.4 - 5.3 mmol/L   Blood gas arterial and oxyhgb   Result Value Ref Range    pH Arterial 7.51 (H) 7.35 - 7.45 pH    pCO2 Arterial 39 35 - 45 mm Hg    pO2 Arterial 63 (L) 80 - 105 mm Hg    Bicarbonate Arterial 31 (H) 21 - 28 mmol/L    FIO2 BIPAP  85%       Oxyhemoglobin Arterial 93 92 - 100 %    Base Excess Art 7.7 mmol/L   Lactic acid level STAT   Result Value Ref Range    Lactic Acid 1.5 0.7 - 2.1 mmol/L   Blood gas blood with oxy   Result Value Ref Range    Ph Blood 7.50 pH    PCO2 Blood 40 mm Hg    PO2 Blood 61 mm Hg    Bicarbonate Blood 31 mmol/L    Base Excess Blood 7.3 mmol/L    Oxyhemoglobin  Blood 91 %   US Lower Extremity Venous Duplex Bilateral    Narrative    VENOUS ULTRASOUND BILATERAL LOWER EXTREMITY  2/2/2017 4:29 PM     HISTORY: Shortness of breath. Possible deep vein thrombosis.    COMPARISON: 08/09/2016.    FINDINGS:  Examination of the deep veins with graded compression and  color flow Doppler with spectral wave form analysis shows no evidence  of thrombus in the common femoral vein, femoral vein, popliteal vein  or calf veins.  There is no venous  insufficiency.      Impression    IMPRESSION: No evidence of deep venous thrombosis.    RONNY BARAKAT MD   Echo limited    Narrative    [id number]  [facility]  [id number]  [id number]^BRIGIDA^GABBIE^JAVIER           [facility]  Echocardiography Laboratory  [address] [place] [id number]        Name: JENIFER MCDONOUGH  MRN: [id number]  : 1940  Study Date: 2017 04:34 PM  Age: 76 yrs  Gender: Male  Patient Location: [facility]  Reason For Study: Pulmonary Emboli  Ordering Physician: GABBIE ALLRED  Referring Physician: JAVIER DE GUZMAN  Performed By: Kris Henning RDCS     BSA: 1.5 m2  Height: 62 in  Weight: 121 lb  HR: 105  BP: 137/89 mmHg  _____________________________________________________________________________  __        Procedure  Limited Portable Echo Adult.  _____________________________________________________________________________  __        Interpretation Summary     Severe (>55mmHg) pulmonary hypertension is present.  The right ventricle is grossly normal size.  The right ventricular systolic function is normal.  Limited views were obtained.  _____________________________________________________________________________  __        Left Ventricle  Left ventricular systolic function is severely reduced. There is severe global  hypokinesia of the left ventricle.     Right Ventricle  The right ventricle is grossly normal size. The right ventricular systolic  function is normal.     Atria  The left atrium is mild to moderately dilated. The right atrium is mildly  dilated.     Mitral Valve  The mitral valve leaflets are mildly thickened. There is mild to moderate (1-  2+) mitral regurgitation. The regurgitant jet is not well visualized and  severity may be underestimated. Pulmonary vein flow was not assessed on  limited views.        Tricuspid Valve  The tricuspid valve is not well visualized. There is moderate (2+) tricuspid  regurgitation. The right ventricular systolic  pressure is approximated at 49.6  mmHg plus the right atrial pressure. Dilated IVC (>2.5cm) with <50%  respiratory collapse; right atrial pressure is estimated at 15-20mmHg. Severe  (>55mmHg) pulmonary hypertension is present.     Pulmonic Valve  The pulmonic valve is not well visualized.  _____________________________________________________________________________  __           Doppler Measurements & Calculations  TR max ward: 352.3 cm/sec  TR max P.6 mmHg           _____________________________________________________________________________  __           Report approved by: Patricia Reynolds 2017 05:41 PM      Glucose by meter   Result Value Ref Range    Glucose 131 (H) 70 - 99 mg/dL   Creatinine   Result Value Ref Range    Creatinine 0.67 0.66 - 1.25 mg/dL    GFR Estimate >90  Non  GFR Calc   >60 mL/min/1.7m2    GFR Estimate If Black >90   GFR Calc   >60 mL/min/1.7m2   INR   Result Value Ref Range    INR 2.64 (H) 0.86 - 1.14   Potassium   Result Value Ref Range    Potassium 2.9 (L) 3.4 - 5.3 mmol/L   Magnesium   Result Value Ref Range    Magnesium 2.1 1.6 - 2.3 mg/dL   EKG 12-lead, tracing only   Result Value Ref Range    Interpretation ECG Click View Image link to view waveform and result    Glucose by meter   Result Value Ref Range    Glucose 106 (H) 70 - 99 mg/dL

## 2017-02-03 NOTE — PROGRESS NOTES
Discussed with patient, patient's wife and daughter in regards of inserting a winkler catheter for comfort, skin protection, urinary retention. Agreed with order for winkler catheter.

## 2017-02-03 NOTE — PLAN OF CARE
Problem: Goal Outcome Summary  Goal: Goal Outcome Summary  Speech Language Therapy Discharge Summary    Reason for therapy discharge:    Patient/family request discontinuation of services.    Progress towards therapy goal(s). See goals on Care Plan in [facility] electronic health record for goal details.  Goals not met.  Barriers to achieving goals:   Pt made comfort cares.    Therapy recommendation(s):    No further therapy is recommended.  ST to sign off as Pt has been made comfort cares.

## 2017-02-03 NOTE — PROGRESS NOTES
ICU Update    Requiring escalating doses of morphine but improved comfort though periods of apnea.Further questions were answered and reviewed course with family and ID and Palliative - with agreement to move towards comfort measures with removal of BIPAP.    Morgan Ramos

## 2017-02-03 NOTE — PROVIDER NOTIFICATION
SR with occasional PACs throughout night, recently convered into what appeared to be A-Fib. EKG obtained. [employer] notified of conversion into Atrial Flutter. Will administer dose of PRN Metoprolol.

## 2017-02-03 NOTE — PLAN OF CARE
Problem: Goal Outcome Summary  Goal: Goal Outcome Summary  Outcome: No Change  VSS ex RR low 40s on max bipap, sats ok sent to ICU 1800

## 2017-02-03 NOTE — PROGRESS NOTES
Right wrist and Left wrist restraints initiated on patient on 2/2/2017 at 7:46 PM    Clinical Justification: Pulling lines, pulling tubes, and pulling equipment  Less Restrictive Alternative: 1:1 patient care, Repositioning, Disguise equipment, Re-evaluate equipment, Pain management  Attending Physician Notified: Yes, Attending Physician's Name:    Order received: Yes     Family Notification: Spouse/significant other   Criteria explained to spouse and daughter  Patient's Response: No evidence of learning  Restraint care Plan initiated: Yes    Sofya Saenz

## 2017-02-03 NOTE — DISCHARGE SUMMARY
Physician Discharge Summary     Patient ID:  Harjit Garcia  [id number]  76 year old  1940    Admit date: 1/29/2017    Discharge date and time: 2/3/2017  7:00 PM     Admitting Physician: García Teixeira MD     Discharge Physician: Morgan Ramos      Admission Diagnoses: Hypoxemia [R09.02]  COPD exacerbation (H) [J44.1]  Acute on chronic congestive heart failure, unspecified congestive heart failure type (H) [I50.9]  Anemia, unspecified type [D64.9]  Acute respiratory failure (H) [J96.00]  Acute on chronic respiratory failure (H) [J96.20]    Discharge Diagnoses:   Acute on Chronic Respiratory Failure  Bilateral Pneumonia  Fungus Ball  Atrial Fibrillation    Admission Condition: serious    Discharged Condition: deceeased    Hospital Course:   76M with pmh of CAD s/p CABx1 a few months ago complicated by recurrent a fib and failure to wean from the vent resulting in trach/peg/LTACH.  He apparently did well at LTACH and was decanulated, but then left AMA. Per family he had been doing well at home until this past weekend where he had an abupt event in middle of night with increased respiratory distress and desat into the 70s prompting his return to [facility] with subsequent admission to step down unit and with escalating BIPAP requirements since. Thoracic surgery, Pulmonary and ID consulted initially as CT Chest with new ground glass opacities, persistent aspergilloma. Patient deemed to unstable for bronch, amiodarone discontinued over concern for possible drug pneumonitis and steroid started as well diuresed over possible. ID work-up with ecoli in sputum as well positive for Cdiff.      He was briefly transferred to ICU night 2/1 with respiratory distress though on arrival to ICU patient looked comfortable beathing comfortably, mid 20s on bipap.  Satting 100% on 70% fio2, titrating down and thus observed and sent back to floor. Over the course of the day patient with continued respiratory difficulties  with respiratory rates in the 30-40 and accessory muscle use. ABG on 13/8 with resp alkalosis and large A-a gradient.  Morphine was given with transient improvement however secondary to respiratory extremis patient transfer back to ICU. Multiple discussions had with family regarding patient course and prognosis with decision to pursue comfort measures.  With family at bedside he passed away peacefully 2/3/17    Consults: cardiology, pulmonary/intensive care, ID and palliative       Disposition: [facility]        Signed:  Morgan Ramos  2/3/2017  4:41 PM

## 2017-02-03 NOTE — PROGRESS NOTES
[facility]    Infectious Disease Progress Note    Date of Service (when I saw the patient): 02/03/2017     Assessment and Plan  Harjit Garcia is a 76 year old male who was admitted on 1/29/2017.     Impression:    76 y.o male with chronic pulmonary nodules with pos cultures from an year ago and positive Galactomannan from last admission, currently on Voriconazole. Aspergillus did not grow in the cultures, now with fungal ball.   Also has baseline COPD was a smoker till the fall of 2016, more than 50 years of smoking.    In Novemeber was admitted with ACS but the stay was complicated with pneumonia acute bacterial ( Klebsiella and E coli on chronic inflammatory process of the lungs)    Was discharged to [facility] from where signed out AMA by family.    Admitted this admission with worsening respiratory status ( Hypoxic),  Some fevers, chills.    What is being described as rigors per family appears to be jerky movement with sleep.   Positive for C diff.   Hallucinations with Ativan.   Continues to have worsening of respiratory status.   Amiodarone being held    Also daughter and wife mentioned radiation injury when patient was still in [place] also talk about some secret biological weapon that was tested on Mr. Garcia, which caused a lot of deaths in [place] at that time.    Already on voriconzaole for 6 plus weeks. Course complete, stop And with negative cultures for aspergillus and imaging which appears more like fungal ball doubt vori is helping much, spoke with Dr. Meade not a surgical candidate.  Stopped voriconazole.       Recommendations:    Sputum positive for E coli pretty susceptible, on ceftriaxone and flagyl.  ? Aspiration, superimposed infection on poor lungs.     Long discussion with family, palliative, ICU team patient being made comfort care only       Ted Gardner MD    Interval History  Moved to ICU  Code status DNR and DNI   On bipap   Physical Exam  Temp: 98.1  F  (36.7  C) Temp src: Axillary BP: 131/76 mmHg   Heart Rate: 98 Resp: 26 SpO2: 98 % O2 Device: BiPAP/CPAP    Filed Vitals:    02/01/17 0613 02/01/17 2200 02/03/17 0400   Weight: 58.8 kg (129 lb 10.1 oz) 55.2 kg (121 lb 11.1 oz) 53.5 kg (117 lb 15.1 oz)     Vital Signs with Ranges  Temp:  [97.4  F (36.3  C)-98.6  F (37  C)] 98.1  F (36.7  C)  Heart Rate:  [] 98  Resp:  [17-45] 26  BP: ()/() 131/76 mmHg  FiO2 (%):  [70 %-100 %] 70 %  SpO2:  [93 %-100 %] 98 %    Constitutional: Awake, alert, even more short of breath on bipap  Lungs: diminished breath sounds, using all accessory muscles  Cardiovascular: Regular rate and rhythm, normal S1 and S2, and no murmur noted  Abdomen: Normal bowel sounds, soft, non-distended, non-tender  Skin: No rashes, no cyanosis, no edema  Other:    Medications    esmolol       dexmedetomidine Stopped (02/03/17 1007)     - MEDICATION INSTRUCTIONS -       - MEDICATION INSTRUCTIONS -       Warfarin Therapy Reminder         metoprolol  5 mg Intravenous Q6H     warfarin  0.5 mg Oral or Feeding Tube ONCE at 18:00     cefTRIAXone  1 g Intravenous Q24H     metroNIDAZOLE  500 mg Intravenous Q6H     predniSONE  40 mg Oral Daily     vancomycin  125 mg Oral 4x Daily     aspirin  81 mg Oral Daily     atorvastatin (LIPITOR) tablet 40 mg  40 mg Oral At Bedtime     ipratropium - albuterol 0.5 mg/2.5 mg/3 mL  3 mL Nebulization 4x daily       Data  All microbiology laboratory data reviewed.  Recent Labs   Lab Test  01/31/17   0746  01/30/17   0325  01/29/17   0306   WBC  15.4*  10.0  9.9   HGB  8.2*  7.0*  8.4*   HCT  25.0*  21.2*  25.1*   MCV  89  90  89   PLT  449  438  563*     Recent Labs   Lab Test  02/03/17   0500  02/02/17   0420  02/01/17   0505   CR  0.67  0.63*  0.75     No lab results found.  Recent Labs   Lab Test  01/30/17   0500  01/29/17   0346  01/29/17   0306  12/12/16   1203  12/12/16   1130  12/10/16   1347  12/05/16   1341  12/05/16   1030  12/03/16   1020   CULT  Light  growth Normal jazzmine  Light growth Escherichia coli  *  No growth after 5 days  No growth after 5 days  No growth  No growth  Light growth Escherichia coli  Light growth Klebsiella oxytoca  *  Moderate growth Escherichia coli  Moderate growth Klebsiella oxytoca  *  No growth  Culture negative for acid fast bacilli  Assayed at [employer] [facility] [employer] [address]    No growth after 4 weeks  Candida glabrata isolated  No additional fungi cultured after 4 weeks incubation  *  No Legionella species isolated  No Actinomyces species isolated  Moderate growth Normal jazzmine  Heavy growth Escherichia coli  Heavy growth Strain 2 Escherichia coli  *  Canceled, Test credited BRONCH LAVAGE RECEIVED, NO SPUTUM. BRONCH LAVAGE CULTURE   ALREADY ORDERED Charge credited

## 2017-02-03 NOTE — PROGRESS NOTES
Family angry about  being scheduled for patient.  Patient's main language is Macedonian, but family says he understands and speaks English and they have signed several waivers refusing  services that are not followed.   waiver was signed and changed in adult profile.  Please ensure no more interpreters are ordered.

## 2017-02-03 NOTE — PROGRESS NOTES
Pt remains on the BIPAP and tolerated well overnight, all the neb tx were given as ordered, BBS diminished, SpO2 94%, will continue to monitor the pt.     RT Cammie.

## 2017-02-04 LAB
BACTERIA SPEC CULT: NO GROWTH
BACTERIA SPEC CULT: NO GROWTH
INTERPRETATION ECG - MUSE: NORMAL
Lab: NORMAL
Lab: NORMAL
MICRO REPORT STATUS: NORMAL
MICRO REPORT STATUS: NORMAL
SPECIMEN SOURCE: NORMAL
SPECIMEN SOURCE: NORMAL

## 2021-05-29 ENCOUNTER — RECORDS - HEALTHEAST (OUTPATIENT)
Dept: ADMINISTRATIVE | Facility: CLINIC | Age: 81
End: 2021-05-29